# Patient Record
Sex: FEMALE | Race: WHITE | NOT HISPANIC OR LATINO
[De-identification: names, ages, dates, MRNs, and addresses within clinical notes are randomized per-mention and may not be internally consistent; named-entity substitution may affect disease eponyms.]

---

## 2017-01-25 ENCOUNTER — MEDICATION RENEWAL (OUTPATIENT)
Age: 72
End: 2017-01-25

## 2017-01-25 ENCOUNTER — APPOINTMENT (OUTPATIENT)
Dept: NEUROLOGY | Facility: CLINIC | Age: 72
End: 2017-01-25

## 2017-01-25 VITALS
WEIGHT: 121 LBS | SYSTOLIC BLOOD PRESSURE: 118 MMHG | BODY MASS INDEX: 20.16 KG/M2 | HEART RATE: 82 BPM | DIASTOLIC BLOOD PRESSURE: 72 MMHG | HEIGHT: 65 IN

## 2017-01-27 ENCOUNTER — TRANSCRIPTION ENCOUNTER (OUTPATIENT)
Age: 72
End: 2017-01-27

## 2017-02-01 ENCOUNTER — TRANSCRIPTION ENCOUNTER (OUTPATIENT)
Age: 72
End: 2017-02-01

## 2017-04-03 ENCOUNTER — RX RENEWAL (OUTPATIENT)
Age: 72
End: 2017-04-03

## 2017-04-05 ENCOUNTER — MEDICATION RENEWAL (OUTPATIENT)
Age: 72
End: 2017-04-05

## 2017-05-10 ENCOUNTER — APPOINTMENT (OUTPATIENT)
Dept: NEUROLOGY | Facility: CLINIC | Age: 72
End: 2017-05-10

## 2017-05-10 VITALS
WEIGHT: 121 LBS | SYSTOLIC BLOOD PRESSURE: 125 MMHG | BODY MASS INDEX: 19.44 KG/M2 | HEIGHT: 66 IN | HEART RATE: 80 BPM | DIASTOLIC BLOOD PRESSURE: 81 MMHG

## 2017-06-14 ENCOUNTER — MEDICATION RENEWAL (OUTPATIENT)
Age: 72
End: 2017-06-14

## 2017-08-29 ENCOUNTER — RX RENEWAL (OUTPATIENT)
Age: 72
End: 2017-08-29

## 2017-09-01 ENCOUNTER — TRANSCRIPTION ENCOUNTER (OUTPATIENT)
Age: 72
End: 2017-09-01

## 2017-09-01 ENCOUNTER — RX RENEWAL (OUTPATIENT)
Age: 72
End: 2017-09-01

## 2017-09-05 ENCOUNTER — RX RENEWAL (OUTPATIENT)
Age: 72
End: 2017-09-05

## 2017-09-13 ENCOUNTER — APPOINTMENT (OUTPATIENT)
Dept: NEUROLOGY | Facility: CLINIC | Age: 72
End: 2017-09-13
Payer: MEDICARE

## 2017-09-13 VITALS — SYSTOLIC BLOOD PRESSURE: 122 MMHG | DIASTOLIC BLOOD PRESSURE: 68 MMHG

## 2017-09-13 PROCEDURE — 99213 OFFICE O/P EST LOW 20 MIN: CPT

## 2017-10-31 ENCOUNTER — MEDICATION RENEWAL (OUTPATIENT)
Age: 72
End: 2017-10-31

## 2017-11-27 ENCOUNTER — OUTPATIENT (OUTPATIENT)
Dept: OUTPATIENT SERVICES | Facility: HOSPITAL | Age: 72
LOS: 1 days | End: 2017-11-27
Payer: MEDICARE

## 2017-11-27 PROCEDURE — 77067 SCR MAMMO BI INCL CAD: CPT

## 2017-11-27 PROCEDURE — 77065 DX MAMMO INCL CAD UNI: CPT

## 2017-11-27 PROCEDURE — G0202: CPT | Mod: 26,59

## 2017-11-27 PROCEDURE — 76641 ULTRASOUND BREAST COMPLETE: CPT | Mod: 26,50

## 2017-11-27 PROCEDURE — 76641 ULTRASOUND BREAST COMPLETE: CPT

## 2017-11-27 PROCEDURE — G0206: CPT | Mod: 26,LT,GG

## 2017-12-07 ENCOUNTER — MEDICATION RENEWAL (OUTPATIENT)
Age: 72
End: 2017-12-07

## 2018-01-17 ENCOUNTER — APPOINTMENT (OUTPATIENT)
Dept: NEUROLOGY | Facility: CLINIC | Age: 73
End: 2018-01-17
Payer: MEDICARE

## 2018-01-17 VITALS — DIASTOLIC BLOOD PRESSURE: 70 MMHG | SYSTOLIC BLOOD PRESSURE: 100 MMHG

## 2018-01-17 PROCEDURE — 99214 OFFICE O/P EST MOD 30 MIN: CPT

## 2018-03-08 ENCOUNTER — FORM ENCOUNTER (OUTPATIENT)
Age: 73
End: 2018-03-08

## 2018-03-09 ENCOUNTER — APPOINTMENT (OUTPATIENT)
Dept: ORTHOPEDIC SURGERY | Facility: CLINIC | Age: 73
End: 2018-03-09
Payer: MEDICARE

## 2018-03-09 ENCOUNTER — OUTPATIENT (OUTPATIENT)
Dept: OUTPATIENT SERVICES | Facility: HOSPITAL | Age: 73
LOS: 1 days | End: 2018-03-09
Payer: MEDICARE

## 2018-03-09 VITALS — WEIGHT: 120 LBS | HEIGHT: 66 IN | BODY MASS INDEX: 19.29 KG/M2

## 2018-03-09 PROCEDURE — 20611 DRAIN/INJ JOINT/BURSA W/US: CPT | Mod: LT

## 2018-03-09 PROCEDURE — 73564 X-RAY EXAM KNEE 4 OR MORE: CPT

## 2018-03-09 PROCEDURE — 73564 X-RAY EXAM KNEE 4 OR MORE: CPT | Mod: 26,50

## 2018-03-09 PROCEDURE — 99203 OFFICE O/P NEW LOW 30 MIN: CPT | Mod: 25

## 2018-03-14 ENCOUNTER — APPOINTMENT (OUTPATIENT)
Dept: ORTHOPEDIC SURGERY | Facility: CLINIC | Age: 73
End: 2018-03-14
Payer: MEDICARE

## 2018-03-14 PROCEDURE — 99213 OFFICE O/P EST LOW 20 MIN: CPT | Mod: 25

## 2018-03-14 PROCEDURE — 20610 DRAIN/INJ JOINT/BURSA W/O US: CPT | Mod: LT

## 2018-03-21 ENCOUNTER — APPOINTMENT (OUTPATIENT)
Dept: ORTHOPEDIC SURGERY | Facility: CLINIC | Age: 73
End: 2018-03-21
Payer: MEDICARE

## 2018-03-21 PROCEDURE — 99213 OFFICE O/P EST LOW 20 MIN: CPT

## 2018-05-24 ENCOUNTER — APPOINTMENT (OUTPATIENT)
Dept: NEUROLOGY | Facility: CLINIC | Age: 73
End: 2018-05-24
Payer: MEDICARE

## 2018-05-24 VITALS
WEIGHT: 125 LBS | BODY MASS INDEX: 20.83 KG/M2 | DIASTOLIC BLOOD PRESSURE: 73 MMHG | SYSTOLIC BLOOD PRESSURE: 124 MMHG | HEIGHT: 65 IN | HEART RATE: 98 BPM

## 2018-05-24 PROCEDURE — 99215 OFFICE O/P EST HI 40 MIN: CPT

## 2018-05-24 RX ORDER — BUTALBITAL, ACETAMINOPHEN AND CAFFEINE 325; 50; 40 MG/1; MG/1; MG/1
50-325-40 TABLET ORAL
Qty: 90 | Refills: 0 | Status: DISCONTINUED | COMMUNITY
Start: 2017-11-02 | End: 2018-05-24

## 2018-05-24 RX ORDER — BUTALBITAL, ACETAMINOPHEN AND CAFFEINE 50; 325; 40 MG/1; MG/1; MG/1
50-325-40 CAPSULE ORAL
Qty: 120 | Refills: 0 | Status: DISCONTINUED | COMMUNITY
Start: 2017-01-25 | End: 2018-05-24

## 2018-06-06 ENCOUNTER — MEDICATION RENEWAL (OUTPATIENT)
Age: 73
End: 2018-06-06

## 2018-06-06 RX ORDER — ELETRIPTAN HYDROBROMIDE 40 MG/1
40 TABLET, FILM COATED ORAL
Qty: 9 | Refills: 5 | Status: DISCONTINUED | COMMUNITY
Start: 2018-05-24 | End: 2018-06-06

## 2018-07-03 ENCOUNTER — TRANSCRIPTION ENCOUNTER (OUTPATIENT)
Age: 73
End: 2018-07-03

## 2018-07-03 ENCOUNTER — MEDICATION RENEWAL (OUTPATIENT)
Age: 73
End: 2018-07-03

## 2018-07-12 ENCOUNTER — RX RENEWAL (OUTPATIENT)
Age: 73
End: 2018-07-12

## 2018-08-21 ENCOUNTER — RX RENEWAL (OUTPATIENT)
Age: 73
End: 2018-08-21

## 2018-10-25 ENCOUNTER — APPOINTMENT (OUTPATIENT)
Dept: NEUROLOGY | Facility: CLINIC | Age: 73
End: 2018-10-25
Payer: MEDICARE

## 2018-10-25 VITALS
HEART RATE: 83 BPM | HEIGHT: 65 IN | DIASTOLIC BLOOD PRESSURE: 79 MMHG | WEIGHT: 131 LBS | BODY MASS INDEX: 21.83 KG/M2 | SYSTOLIC BLOOD PRESSURE: 121 MMHG

## 2018-10-25 PROCEDURE — 99215 OFFICE O/P EST HI 40 MIN: CPT

## 2018-11-07 ENCOUNTER — APPOINTMENT (OUTPATIENT)
Dept: MAMMOGRAPHY | Facility: HOSPITAL | Age: 73
End: 2018-11-07
Payer: MEDICARE

## 2018-11-07 ENCOUNTER — OUTPATIENT (OUTPATIENT)
Dept: OUTPATIENT SERVICES | Facility: HOSPITAL | Age: 73
LOS: 1 days | End: 2018-11-07
Payer: MEDICARE

## 2018-11-07 ENCOUNTER — APPOINTMENT (OUTPATIENT)
Dept: ULTRASOUND IMAGING | Facility: HOSPITAL | Age: 73
End: 2018-11-07
Payer: MEDICARE

## 2018-11-07 PROCEDURE — 77065 DX MAMMO INCL CAD UNI: CPT | Mod: 26,GG,LT

## 2018-11-07 PROCEDURE — 76641 ULTRASOUND BREAST COMPLETE: CPT | Mod: 26,50

## 2018-11-07 PROCEDURE — 77067 SCR MAMMO BI INCL CAD: CPT

## 2018-11-07 PROCEDURE — 77063 BREAST TOMOSYNTHESIS BI: CPT | Mod: 26,59

## 2018-11-07 PROCEDURE — 77067 SCR MAMMO BI INCL CAD: CPT | Mod: 26,59

## 2018-11-07 PROCEDURE — 76641 ULTRASOUND BREAST COMPLETE: CPT

## 2018-11-07 PROCEDURE — 77065 DX MAMMO INCL CAD UNI: CPT

## 2018-11-07 PROCEDURE — 77063 BREAST TOMOSYNTHESIS BI: CPT

## 2018-11-19 ENCOUNTER — OUTPATIENT (OUTPATIENT)
Dept: OUTPATIENT SERVICES | Facility: HOSPITAL | Age: 73
LOS: 1 days | End: 2018-11-19
Payer: MEDICARE

## 2018-11-19 ENCOUNTER — RESULT REVIEW (OUTPATIENT)
Age: 73
End: 2018-11-19

## 2018-11-19 ENCOUNTER — APPOINTMENT (OUTPATIENT)
Dept: MAMMOGRAPHY | Facility: HOSPITAL | Age: 73
End: 2018-11-19
Payer: MEDICARE

## 2018-11-19 PROCEDURE — 88341 IMHCHEM/IMCYTCHM EA ADD ANTB: CPT

## 2018-11-19 PROCEDURE — 88305 TISSUE EXAM BY PATHOLOGIST: CPT

## 2018-11-19 PROCEDURE — 19081 BX BREAST 1ST LESION STRTCTC: CPT | Mod: LT

## 2018-11-19 PROCEDURE — A4648: CPT

## 2018-11-19 PROCEDURE — 19081 BX BREAST 1ST LESION STRTCTC: CPT

## 2018-11-21 LAB — SURGICAL PATHOLOGY STUDY: SIGNIFICANT CHANGE UP

## 2019-03-19 ENCOUNTER — TRANSCRIPTION ENCOUNTER (OUTPATIENT)
Age: 74
End: 2019-03-19

## 2019-03-20 ENCOUNTER — TRANSCRIPTION ENCOUNTER (OUTPATIENT)
Age: 74
End: 2019-03-20

## 2019-03-28 ENCOUNTER — MEDICATION RENEWAL (OUTPATIENT)
Age: 74
End: 2019-03-28

## 2019-04-03 ENCOUNTER — APPOINTMENT (OUTPATIENT)
Dept: OTOLARYNGOLOGY | Facility: CLINIC | Age: 74
End: 2019-04-03
Payer: MEDICARE

## 2019-04-03 VITALS
HEART RATE: 89 BPM | SYSTOLIC BLOOD PRESSURE: 115 MMHG | OXYGEN SATURATION: 97 % | TEMPERATURE: 98 F | DIASTOLIC BLOOD PRESSURE: 74 MMHG | RESPIRATION RATE: 16 BRPM

## 2019-04-03 VITALS — WEIGHT: 125 LBS | BODY MASS INDEX: 20.8 KG/M2

## 2019-04-03 DIAGNOSIS — G43.109 MIGRAINE WITH AURA, NOT INTRACTABLE, W/OUT STATUS MIGRAINOSUS: ICD-10-CM

## 2019-04-03 PROCEDURE — 31231 NASAL ENDOSCOPY DX: CPT

## 2019-04-03 PROCEDURE — 99204 OFFICE O/P NEW MOD 45 MIN: CPT | Mod: 25

## 2019-04-03 NOTE — REASON FOR VISIT
[Initial Evaluation] : an initial evaluation for [FreeTextEntry2] : vestibular migraine, allergic rhinitis

## 2019-04-03 NOTE — PHYSICAL EXAM
[Nasal Endoscopy Performed] : nasal endoscopy was performed, see procedure section for findings [de-identified] : ar [de-identified] : ar [Midline] : trachea located in midline position [] : Pullman-Hallpike test is negative [Normal] : no rashes [de-identified] : gait steady

## 2019-04-03 NOTE — HISTORY OF PRESENT ILLNESS
[de-identified] : 74 yo woman whose  just had  culture positive for an unusual but sensitive staph infection her to make sure she does not have it also. She relates chronic allergic rhinitis for which she uses Nasacort and she has shared her 's. She also has vestibular migraine and occ posnal vertigo. She recently had an episode last weekend which was severe. Sees Dr Kanner for her migraines and has medication which helps.

## 2019-04-07 LAB
BACTERIA NOSE AEROBE CULT: NORMAL
BACTERIA NOSE AEROBE CULT: NORMAL

## 2019-04-25 ENCOUNTER — APPOINTMENT (OUTPATIENT)
Dept: NEUROLOGY | Facility: CLINIC | Age: 74
End: 2019-04-25
Payer: MEDICARE

## 2019-04-25 VITALS
HEIGHT: 65 IN | DIASTOLIC BLOOD PRESSURE: 75 MMHG | SYSTOLIC BLOOD PRESSURE: 125 MMHG | BODY MASS INDEX: 21.49 KG/M2 | HEART RATE: 92 BPM | WEIGHT: 129 LBS

## 2019-04-25 PROCEDURE — 99214 OFFICE O/P EST MOD 30 MIN: CPT

## 2019-04-25 NOTE — ASSESSMENT
[FreeTextEntry1] : Ms. Looney is seen in follow up for her mixed headache syndrome, with migraine and tension-type headaches. She states that she has not had any severe migraines since her last visit, in October. A few days per month, she awakens with a feeling like she is "going to get one". The intensity of those headaches only about 2 or 3/10. However, she takes a Rizatriptan, with relief. She has not been using the Esgic, since we have a long conversation about controlling it. She takes alprazolam occasionally.\par Mrs. 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 She seems to settle down quite a bit. We spent most of the visit discussing control of medications. She has agreed to obtain all of her medications from her pharmacy in [place], so that I can monitor the prescriptions. I will renew her prescriptions and see her in followup in 6 months.  I have checked the [facility] website and there have been no unauthorized prescriptions.

## 2019-04-25 NOTE — HISTORY OF PRESENT ILLNESS
[FreeTextEntry1] : Ms. Looney is seen in follow up for her mixed headache syndrome, with migraine and tension-type headaches. She states that she has not had any severe migraines since her last visit, in October. A few days per month, she awakens with a feeling like she is "going to get one". The intensity of those headaches only about 2 or 3/10. However, she takes a Rizatriptan, with relief. She has not been using the Esgic, since we have a long conversation about controlling it. She takes alprazolam occasionally.

## 2019-04-26 ENCOUNTER — MEDICATION RENEWAL (OUTPATIENT)
Age: 74
End: 2019-04-26

## 2019-06-04 ENCOUNTER — APPOINTMENT (OUTPATIENT)
Dept: INTERNAL MEDICINE | Facility: CLINIC | Age: 74
End: 2019-06-04
Payer: MEDICARE

## 2019-06-04 VITALS
DIASTOLIC BLOOD PRESSURE: 64 MMHG | SYSTOLIC BLOOD PRESSURE: 108 MMHG | WEIGHT: 128 LBS | BODY MASS INDEX: 21.33 KG/M2 | OXYGEN SATURATION: 98 % | HEART RATE: 91 BPM | TEMPERATURE: 98 F | HEIGHT: 65 IN

## 2019-06-04 DIAGNOSIS — Z78.9 OTHER SPECIFIED HEALTH STATUS: ICD-10-CM

## 2019-06-04 DIAGNOSIS — M17.12 UNILATERAL PRIMARY OSTEOARTHRITIS, LEFT KNEE: ICD-10-CM

## 2019-06-04 DIAGNOSIS — H81.10 BENIGN PAROXYSMAL VERTIGO, UNSPECIFIED EAR: ICD-10-CM

## 2019-06-04 DIAGNOSIS — J30.1 ALLERGIC RHINITIS DUE TO POLLEN: ICD-10-CM

## 2019-06-04 DIAGNOSIS — Z80.0 FAMILY HISTORY OF MALIGNANT NEOPLASM OF DIGESTIVE ORGANS: ICD-10-CM

## 2019-06-04 PROCEDURE — 99203 OFFICE O/P NEW LOW 30 MIN: CPT

## 2019-06-04 RX ORDER — VITAMIN E ACID SUCCINATE 268 MG
TABLET ORAL
Refills: 0 | Status: ACTIVE | COMMUNITY

## 2019-06-04 RX ORDER — PNV NO.95/FERROUS FUM/FOLIC AC 28MG-0.8MG
TABLET ORAL
Refills: 0 | Status: ACTIVE | COMMUNITY

## 2019-06-04 RX ORDER — BUTALBITAL, ACETAMINOPHEN AND CAFFEINE 50; 325; 40 MG/1; MG/1; MG/1
50-325-40 CAPSULE ORAL
Qty: 120 | Refills: 1 | Status: COMPLETED | COMMUNITY
Start: 2018-07-03 | End: 2019-06-04

## 2019-06-04 RX ORDER — MOMETASONE 50 UG/1
50 SPRAY, METERED NASAL
Qty: 1 | Refills: 5 | Status: COMPLETED | COMMUNITY
Start: 2019-04-26 | End: 2019-06-04

## 2019-06-04 RX ORDER — CHROMIUM 200 MCG
1000 TABLET ORAL
Refills: 0 | Status: ACTIVE | COMMUNITY

## 2019-06-04 RX ORDER — METAPROTERENOL SULFATE 20 MG
TABLET ORAL
Refills: 0 | Status: ACTIVE | COMMUNITY

## 2019-06-04 RX ORDER — VALACYCLOVIR 1 G/1
1 TABLET, FILM COATED ORAL
Qty: 20 | Refills: 0 | Status: COMPLETED | COMMUNITY
Start: 2019-01-25 | End: 2019-06-04

## 2019-06-04 RX ORDER — METHYLPREDNISOLONE 4 MG/1
4 TABLET ORAL
Qty: 21 | Refills: 0 | Status: COMPLETED | COMMUNITY
Start: 2018-12-20 | End: 2019-06-04

## 2019-06-04 RX ORDER — MULTIVIT-MIN/IRON/FOLIC ACID/K 18-600-40
500 CAPSULE ORAL
Refills: 0 | Status: ACTIVE | COMMUNITY

## 2019-06-04 NOTE — ASSESSMENT
[FreeTextEntry1] : Pt will return for fasting labs to evaulation statin therapy.\par F/u neuro as needed for migraines.\par Syncope attributed to multiple factors, none cardiac, resolved with IVF.\par Will return for fasting labs.

## 2019-06-04 NOTE — PHYSICAL EXAM
[No Acute Distress] : no acute distress [Well Nourished] : well nourished [Normal Outer Ear/Nose] : the outer ears and nose were normal in appearance [Normal Sclera/Conjunctiva] : normal sclera/conjunctiva [No Respiratory Distress] : no respiratory distress  [Normal Gait] : normal gait [No Edema] : there was no peripheral edema [Alert and Oriented x3] : oriented to person, place, and time [de-identified] : OA changes on hands [Normal Affect] : the affect was normal

## 2019-06-04 NOTE — HEALTH RISK ASSESSMENT
[Patient reported colonoscopy was normal] : Patient reported colonoscopy was normal [Patient reported PAP Smear was normal] : Patient reported PAP Smear was normal [0] : 2) Feeling down, depressed, or hopeless: Not at all (0) [Patient reported mammogram was normal] : Patient reported mammogram was normal [[id number]] : 0 [PapSmearDate] : 12/18 [MammogramDate] : 12/18 [ColonoscopyDate] : 05/17

## 2019-06-04 NOTE — HISTORY OF PRESENT ILLNESS
[de-identified] : 73 y/o female is here to establish care.\par She had a syncopal episode last month in [place] in the setting of a long flight, several glasses of wine (which is unusual for her), and taking her migraine meds. She went to ER and EKG, CXR and labs were good. She received several liters of IVF and felt fine so came home. No symptoms since. \par Gets bad migraines and sees neurology. No symptoms today. Knows how to treat.\par On alternating doses of Atorvastatin 10/20 mg. Had CPE in January. ALl was well.\par UTD with HCM including vaccines.\par Wants to review all supplements with me.

## 2019-07-22 ENCOUNTER — RX RENEWAL (OUTPATIENT)
Age: 74
End: 2019-07-22

## 2019-07-31 ENCOUNTER — APPOINTMENT (OUTPATIENT)
Dept: INTERNAL MEDICINE | Facility: CLINIC | Age: 74
End: 2019-07-31
Payer: MEDICARE

## 2019-07-31 VITALS
BODY MASS INDEX: 21.33 KG/M2 | TEMPERATURE: 97.9 F | HEART RATE: 87 BPM | SYSTOLIC BLOOD PRESSURE: 118 MMHG | WEIGHT: 128 LBS | DIASTOLIC BLOOD PRESSURE: 76 MMHG | HEIGHT: 65 IN | OXYGEN SATURATION: 97 %

## 2019-07-31 PROCEDURE — 99213 OFFICE O/P EST LOW 20 MIN: CPT | Mod: 25

## 2019-07-31 PROCEDURE — 36415 COLL VENOUS BLD VENIPUNCTURE: CPT

## 2019-07-31 NOTE — HISTORY OF PRESENT ILLNESS
[FreeTextEntry1] : Here to f/u on high lipids.\par Last labs done 11/18. \par No complaints.\par Will need refills.\par

## 2019-10-03 ENCOUNTER — MEDICATION RENEWAL (OUTPATIENT)
Age: 74
End: 2019-10-03

## 2019-10-07 ENCOUNTER — MEDICATION RENEWAL (OUTPATIENT)
Age: 74
End: 2019-10-07

## 2019-10-09 ENCOUNTER — TRANSCRIPTION ENCOUNTER (OUTPATIENT)
Age: 74
End: 2019-10-09

## 2019-10-14 ENCOUNTER — RX RENEWAL (OUTPATIENT)
Age: 74
End: 2019-10-14

## 2019-10-31 ENCOUNTER — APPOINTMENT (OUTPATIENT)
Dept: NEUROLOGY | Facility: CLINIC | Age: 74
End: 2019-10-31
Payer: MEDICARE

## 2019-10-31 VITALS
SYSTOLIC BLOOD PRESSURE: 118 MMHG | HEIGHT: 65 IN | DIASTOLIC BLOOD PRESSURE: 73 MMHG | HEART RATE: 93 BPM | WEIGHT: 123 LBS | BODY MASS INDEX: 20.49 KG/M2

## 2019-10-31 VITALS — HEART RATE: 84 BPM | DIASTOLIC BLOOD PRESSURE: 70 MMHG | SYSTOLIC BLOOD PRESSURE: 110 MMHG

## 2019-10-31 VITALS — DIASTOLIC BLOOD PRESSURE: 70 MMHG | SYSTOLIC BLOOD PRESSURE: 110 MMHG

## 2019-10-31 PROCEDURE — 99215 OFFICE O/P EST HI 40 MIN: CPT

## 2019-10-31 NOTE — PHYSICAL EXAM
[General Appearance - Alert] : alert [General Appearance - In No Acute Distress] : in no acute distress [Oriented To Time, Place, And Person] : oriented to person, place, and time [Impaired Insight] : insight and judgment were intact [Affect] : the affect was normal [Person] : oriented to person [Place] : oriented to place [Time] : oriented to time [Concentration Intact] : normal concentrating ability [Visual Intact] : visual attention was ~T not ~L decreased [Naming Objects] : no difficulty naming common objects [Repeating Phrases] : no difficulty repeating a phrase [Writing A Sentence] : no difficulty writing a sentence [Fluency] : fluency intact [Comprehension] : comprehension intact [Reading] : reading intact [Past History] : adequate knowledge of personal past history [Cranial Nerves Optic (II)] : visual acuity intact bilaterally,  visual fields full to confrontation, pupils equal round and reactive to light [Cranial Nerves Oculomotor (III)] : extraocular motion intact [Cranial Nerves Trigeminal (V)] : facial sensation intact symmetrically [Cranial Nerves Facial (VII)] : face symmetrical [Cranial Nerves Vestibulocochlear (VIII)] : hearing was intact bilaterally [Cranial Nerves Glossopharyngeal (IX)] : tongue and palate midline [Cranial Nerves Accessory (XI - Cranial And Spinal)] : head turning and shoulder shrug symmetric [Cranial Nerves Hypoglossal (XII)] : there was no tongue deviation with protrusion [Motor Tone] : muscle tone was normal in all four extremities [Motor Strength] : muscle strength was normal in all four extremities [No Muscle Atrophy] : normal bulk in all four extremities [Sensation Tactile Decrease] : light touch was intact [Abnormal Walk] : normal gait [Balance] : balance was intact [2+] : Ankle jerk left 2+ [] : no respiratory distress [Auscultation Breath Sounds / Voice Sounds] : lungs were clear to auscultation bilaterally [Heart Rate And Rhythm] : heart rate was normal and rhythm regular [Heart Sounds] : normal S1 and S2 [Heart Sounds Gallop] : no gallops [Heart Sounds Pericardial Friction Rub] : no pericardial rub [Systolic grade ___/6] : A grade [unfilled]/6 systolic murmur was heard. [Past-pointing] : there was no past-pointing [Tremor] : no tremor present [Plantar Reflex Right Only] : normal on the right [Plantar Reflex Left Only] : normal on the left

## 2019-10-31 NOTE — HISTORY OF PRESENT ILLNESS
[FreeTextEntry1] : Mrs. Looney is seen in followup for her mixed headache syndrome, but with a new problem. She was at a wedding in [place] and, as is unusual for her, she had some line. The next day, she had a severe migraine with nausea, vomiting, photophobia and phonophobia. She was unable to the entire day. She went to the wedding that night and apparently passed out. She was taken to a local hospital, where she was found to be severely dehydrated, hypotensive and hypothermic. With hydration and warming, she recovered entirely. She was apparently in and out for about 5 or 6 minutes and regained full consciousness after that. She underwent a full workup that was unrevealing. When she returned, she saw a new internist, who performed a full and unremarkable physical examination.  She has not had any further significant headaches. Her marital issues continue.

## 2019-10-31 NOTE — ASSESSMENT
[FreeTextEntry1] : Mrs. Looney is seen in followup for her mixed headache syndrome, but with a new problem. She was at a wedding in [place] and, as is unusual for her, she had some line. The next day, she had a severe migraine with nausea, vomiting, photophobia and phonophobia. She was unable to the entire day. She went to the wedding that night and apparently passed out. She was taken to a local hospital, where she was found to be severely dehydrated, hypotensive and hypothermic. With hydration and warming, she recovered entirely. She was apparently in and out for about 5 or 6 minutes and regained full consciousness after that. She underwent a full workup that was unrevealing. When she returned, she saw a new internist, who performed a full and unremarkable physical examination.  She has not had any further significant headaches. Her marital issues continue.\par On examination today, her blood pressure is normal and there are no orthostatic changes. Her neurological examination remains entirely normal. 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 Her heart murmur is extremely soft and may be simply a flow murmur.There is nothing to indicate that this was anything other than syncope following severe dehydration. I spent most of the visit discussing appropriate hydration and her marital issues. I will see her in followup in 6 months.

## 2019-11-07 ENCOUNTER — APPOINTMENT (OUTPATIENT)
Dept: ULTRASOUND IMAGING | Facility: CLINIC | Age: 74
End: 2019-11-07
Payer: MEDICARE

## 2019-11-07 ENCOUNTER — APPOINTMENT (OUTPATIENT)
Dept: MAMMOGRAPHY | Facility: CLINIC | Age: 74
End: 2019-11-07
Payer: MEDICARE

## 2019-11-07 ENCOUNTER — OUTPATIENT (OUTPATIENT)
Dept: OUTPATIENT SERVICES | Facility: HOSPITAL | Age: 74
LOS: 1 days | End: 2019-11-07

## 2019-11-07 PROCEDURE — 77063 BREAST TOMOSYNTHESIS BI: CPT | Mod: 26

## 2019-11-07 PROCEDURE — 77067 SCR MAMMO BI INCL CAD: CPT | Mod: 26

## 2019-11-07 PROCEDURE — 76641 ULTRASOUND BREAST COMPLETE: CPT | Mod: 26,50

## 2019-11-11 ENCOUNTER — RX RENEWAL (OUTPATIENT)
Age: 74
End: 2019-11-11

## 2019-12-07 ENCOUNTER — RX RENEWAL (OUTPATIENT)
Age: 74
End: 2019-12-07

## 2019-12-30 ENCOUNTER — RX RENEWAL (OUTPATIENT)
Age: 74
End: 2019-12-30

## 2020-01-14 ENCOUNTER — APPOINTMENT (OUTPATIENT)
Dept: OBGYN | Facility: CLINIC | Age: 75
End: 2020-01-14
Payer: MEDICARE

## 2020-01-14 VITALS
BODY MASS INDEX: 22.88 KG/M2 | HEIGHT: 65 IN | DIASTOLIC BLOOD PRESSURE: 60 MMHG | SYSTOLIC BLOOD PRESSURE: 100 MMHG | WEIGHT: 137.31 LBS

## 2020-01-14 DIAGNOSIS — Z87.898 PERSONAL HISTORY OF OTHER SPECIFIED CONDITIONS: ICD-10-CM

## 2020-01-14 PROCEDURE — G0101: CPT

## 2020-01-14 NOTE — PHYSICAL EXAM
[Awake] : awake [Alert] : alert [Examination Of The Breasts] : a normal appearance [Breast Palpation Diffuse Fibrous Tissue Bilateral] : fibrocystic changes [Breast Nipple Flattening Left] : flattened [No Discharge] : no discharge [Soft] : soft [Scar] : a scar was noted [Suprapubic] : in the suprapubic area [No Mass] : no masses were palpated [Soft, Nontender] : the abdomen was soft and nontender [No HSM] : no hepatosplenomegaly noted [None] : no hernias were palpable [Oriented x3] : oriented to person, place, and time [Normal] : clitoris [Labia Minora] : labia minora [Labia Majora] : labia major [Pap Obtained] : a Pap smear was performed [No Bleeding] : there was no active vaginal bleeding [Uterine Adnexae] : were not tender and not enlarged [No Tenderness] : no rectal tenderness [Nl Sphincter Tone] : normal sphincter tone [Acute Distress] : no acute distress [Mass] : no breast mass [Nipple Discharge] : no nipple discharge [Axillary LAD] : no axillary lymphadenopathy [Tender] : non tender [Axillary Lymph Nodes Enlarged Bilaterally] : no enlarged nodes [Discharge] : no discharge [Motion Tenderness] : there was no cervical motion tenderness [Adnexa Tenderness] : were not tender [Ovarian Mass (___ Cm)] : there were no adnexal masses [Occult Blood] : occult blood test from digital rectal exam was negative [Internal Hemorrhoid] : no internal hemorrhoids [External Hemorrhoid] : no external hemorrhoids

## 2020-01-14 NOTE — HISTORY OF PRESENT ILLNESS
[1 Year Ago] : 1 year ago [Good] : being in good health [Healthy Diet] : a healthy diet [Regular Exercise] : regular exercise [Up to Date] : up to date with ~his/her~ STD screening [Weight Concerns] : no concerns with her weight [Menstrual Problems] : reports normal menses [Fever] : no fever [Nausea] : no nausea [Vomiting] : no vomiting [Diarrhea] : no diarrhea [Pelvic Pressure] : no pelvic pressure [Vaginal Bleeding] : no vaginal bleeding [Dysuria] : no dysuria [Burning] : no burning [Lesion] : no lesion [Sexually Active] : is not sexually active

## 2020-01-22 ENCOUNTER — RX RENEWAL (OUTPATIENT)
Age: 75
End: 2020-01-22

## 2020-01-28 ENCOUNTER — RX RENEWAL (OUTPATIENT)
Age: 75
End: 2020-01-28

## 2020-01-28 ENCOUNTER — MOBILE ON CALL (OUTPATIENT)
Age: 75
End: 2020-01-28

## 2020-01-29 LAB — CYTOLOGY CVX/VAG DOC THIN PREP: NORMAL

## 2020-02-07 ENCOUNTER — TRANSCRIPTION ENCOUNTER (OUTPATIENT)
Age: 75
End: 2020-02-07

## 2020-02-13 ENCOUNTER — TRANSCRIPTION ENCOUNTER (OUTPATIENT)
Age: 75
End: 2020-02-13

## 2020-02-19 ENCOUNTER — APPOINTMENT (OUTPATIENT)
Dept: INTERNAL MEDICINE | Facility: CLINIC | Age: 75
End: 2020-02-19
Payer: MEDICARE

## 2020-02-19 VITALS
HEIGHT: 65 IN | TEMPERATURE: 97.8 F | WEIGHT: 132 LBS | DIASTOLIC BLOOD PRESSURE: 62 MMHG | BODY MASS INDEX: 21.99 KG/M2 | OXYGEN SATURATION: 97 % | HEART RATE: 80 BPM | SYSTOLIC BLOOD PRESSURE: 108 MMHG

## 2020-02-19 DIAGNOSIS — Z12.39 ENCOUNTER FOR OTHER SCREENING FOR MALIGNANT NEOPLASM OF BREAST: ICD-10-CM

## 2020-02-19 PROCEDURE — 99214 OFFICE O/P EST MOD 30 MIN: CPT | Mod: 25

## 2020-02-19 PROCEDURE — 36415 COLL VENOUS BLD VENIPUNCTURE: CPT

## 2020-02-19 RX ORDER — NORTRIPTYLINE HYDROCHLORIDE 25 MG/1
25 CAPSULE ORAL
Qty: 180 | Refills: 2 | Status: COMPLETED | COMMUNITY
End: 2020-02-19

## 2020-02-19 NOTE — HEALTH RISK ASSESSMENT
[Good] : ~his/her~  mood as  good [No falls in past year] : Patient reported no falls in the past year [0] : 2) Feeling down, depressed, or hopeless: Not at all (0) [GNS4Oxnpr] : 0 [ColonoscopyDate] : 05/17 [MammogramDate] : 12/19 [PapSmearDate] : 01/20

## 2020-02-19 NOTE — HISTORY OF PRESENT ILLNESS
[de-identified] : 75 y/o female is here for f/u.\par On statin-alternates 10mg/20mg. No side effects.\par Used to take Fosamax x 5 years (about 15 years ago). Hasn't had bone density in years.\par UTD with MAMMO.\par Had increase in migraine frequency and intensity so increased TCA dose to 50 mg and feels "better now."

## 2020-02-20 LAB
ALBUMIN SERPL ELPH-MCNC: 4.6 G/DL
ALP BLD-CCNC: 90 U/L
ALT SERPL-CCNC: 13 U/L
ANION GAP SERPL CALC-SCNC: 11 MMOL/L
AST SERPL-CCNC: 24 U/L
BILIRUB SERPL-MCNC: 0.2 MG/DL
BUN SERPL-MCNC: 17 MG/DL
CALCIUM SERPL-MCNC: 9.7 MG/DL
CHLORIDE SERPL-SCNC: 104 MMOL/L
CHOLEST SERPL-MCNC: 176 MG/DL
CHOLEST/HDLC SERPL: 2.7 RATIO
CK SERPL-CCNC: 67 U/L
CO2 SERPL-SCNC: 25 MMOL/L
CREAT SERPL-MCNC: 0.82 MG/DL
ESTIMATED AVERAGE GLUCOSE: 117 MG/DL
GLUCOSE SERPL-MCNC: 95 MG/DL
HBA1C MFR BLD HPLC: 5.7 %
HDLC SERPL-MCNC: 64 MG/DL
LDLC SERPL CALC-MCNC: 97 MG/DL
POTASSIUM SERPL-SCNC: 4.6 MMOL/L
PROT SERPL-MCNC: 6.6 G/DL
SODIUM SERPL-SCNC: 140 MMOL/L
TRIGL SERPL-MCNC: 75 MG/DL
TSH SERPL-ACNC: 3.34 UIU/ML

## 2020-02-24 ENCOUNTER — FORM ENCOUNTER (OUTPATIENT)
Age: 75
End: 2020-02-24

## 2020-02-25 ENCOUNTER — OUTPATIENT (OUTPATIENT)
Dept: OUTPATIENT SERVICES | Facility: HOSPITAL | Age: 75
LOS: 1 days | End: 2020-02-25

## 2020-02-25 ENCOUNTER — APPOINTMENT (OUTPATIENT)
Dept: RADIOLOGY | Facility: CLINIC | Age: 75
End: 2020-02-25
Payer: MEDICARE

## 2020-02-25 PROCEDURE — 77080 DXA BONE DENSITY AXIAL: CPT | Mod: 26

## 2020-03-03 ENCOUNTER — NON-APPOINTMENT (OUTPATIENT)
Age: 75
End: 2020-03-03

## 2020-03-04 ENCOUNTER — NON-APPOINTMENT (OUTPATIENT)
Age: 75
End: 2020-03-04

## 2020-05-17 ENCOUNTER — RX RENEWAL (OUTPATIENT)
Age: 75
End: 2020-05-17

## 2020-06-01 ENCOUNTER — RX RENEWAL (OUTPATIENT)
Age: 75
End: 2020-06-01

## 2020-06-29 ENCOUNTER — RX RENEWAL (OUTPATIENT)
Age: 75
End: 2020-06-29

## 2020-07-23 ENCOUNTER — APPOINTMENT (OUTPATIENT)
Dept: NEUROLOGY | Facility: CLINIC | Age: 75
End: 2020-07-23
Payer: MEDICARE

## 2020-07-23 VITALS
HEART RATE: 90 BPM | HEIGHT: 65 IN | SYSTOLIC BLOOD PRESSURE: 120 MMHG | BODY MASS INDEX: 21.99 KG/M2 | DIASTOLIC BLOOD PRESSURE: 67 MMHG | WEIGHT: 132 LBS

## 2020-07-23 VITALS — TEMPERATURE: 97.8 F

## 2020-07-23 PROCEDURE — 99214 OFFICE O/P EST MOD 30 MIN: CPT

## 2020-07-23 NOTE — HISTORY OF PRESENT ILLNESS
[FreeTextEntry1] : Mrs. Looney is seen in followup for her mixed headache syndrome. She has been doing reasonably well from the headache standpoint. She has not had any migraines. She has occasional tension headaches. Her main problem now is her anxiety. She continues to have a stress over her perception of her husbands alcoholism. She is further stressed by the isolation of the current pandemic.  She has isolated herself in her home in [place]. She uses Xanax for sleep.

## 2020-07-23 NOTE — ASSESSMENT
[FreeTextEntry1] : Mrs. Looney is seen in followup for her mixed headache syndrome. She has been doing reasonably well from the headache standpoint. She has not had any migraines. She has occasional tension headaches. Her main problem now is her anxiety. She continues to have a stress over her perception of her husbands alcoholism. She is further stressed by the isolation of the current pandemic.  She has isolated herself in her home in [place]. She uses Xanax for sleep.\par As with most of the prior visits, the entire visit was spent dealing with her anxiety and emotional concerns. I have given her instructions on mindfulness and on breathing techniques. I have also reinforced the need to take special care with controlled substances. We will have a virtual visit in 3 months, because she is concerned about going out.

## 2020-07-26 ENCOUNTER — NON-APPOINTMENT (OUTPATIENT)
Age: 75
End: 2020-07-26

## 2020-07-27 ENCOUNTER — RX RENEWAL (OUTPATIENT)
Age: 75
End: 2020-07-27

## 2020-07-28 RX ORDER — BUTALBITAL, ACETAMINOPHEN AND CAFFEINE 50; 325; 40 MG/1; MG/1; MG/1
50-325-40 CAPSULE ORAL
Qty: 120 | Refills: 1 | Status: DISCONTINUED | COMMUNITY
Start: 2020-07-23 | End: 2020-07-28

## 2020-08-11 ENCOUNTER — RX RENEWAL (OUTPATIENT)
Age: 75
End: 2020-08-11

## 2020-08-14 ENCOUNTER — RX RENEWAL (OUTPATIENT)
Age: 75
End: 2020-08-14

## 2020-09-03 ENCOUNTER — APPOINTMENT (OUTPATIENT)
Dept: INTERNAL MEDICINE | Facility: CLINIC | Age: 75
End: 2020-09-03
Payer: MEDICARE

## 2020-09-03 ENCOUNTER — NON-APPOINTMENT (OUTPATIENT)
Age: 75
End: 2020-09-03

## 2020-09-03 VITALS
OXYGEN SATURATION: 96 % | BODY MASS INDEX: 21.99 KG/M2 | TEMPERATURE: 98 F | WEIGHT: 132 LBS | DIASTOLIC BLOOD PRESSURE: 62 MMHG | HEIGHT: 65 IN | SYSTOLIC BLOOD PRESSURE: 108 MMHG | HEART RATE: 91 BPM

## 2020-09-03 DIAGNOSIS — Z87.898 PERSONAL HISTORY OF OTHER SPECIFIED CONDITIONS: ICD-10-CM

## 2020-09-03 DIAGNOSIS — S83.242A OTHER TEAR OF MEDIAL MENISCUS, CURRENT INJURY, LEFT KNEE, INITIAL ENCOUNTER: ICD-10-CM

## 2020-09-03 DIAGNOSIS — Z87.39 PERSONAL HISTORY OF OTHER DISEASES OF THE MUSCULOSKELETAL SYSTEM AND CONNECTIVE TISSUE: ICD-10-CM

## 2020-09-03 DIAGNOSIS — Z23 ENCOUNTER FOR IMMUNIZATION: ICD-10-CM

## 2020-09-03 DIAGNOSIS — Z01.00 ENCOUNTER FOR EXAMINATION OF EYES AND VISION W/OUT ABNORMAL FINDINGS: ICD-10-CM

## 2020-09-03 DIAGNOSIS — M70.52 OTHER BURSITIS OF KNEE, LEFT KNEE: ICD-10-CM

## 2020-09-03 DIAGNOSIS — Z13.820 ENCOUNTER FOR SCREENING FOR OSTEOPOROSIS: ICD-10-CM

## 2020-09-03 PROCEDURE — 90662 IIV NO PRSV INCREASED AG IM: CPT

## 2020-09-03 PROCEDURE — 36415 COLL VENOUS BLD VENIPUNCTURE: CPT

## 2020-09-03 PROCEDURE — G0439: CPT

## 2020-09-03 PROCEDURE — 99212 OFFICE O/P EST SF 10 MIN: CPT | Mod: 25

## 2020-09-03 PROCEDURE — 93000 ELECTROCARDIOGRAM COMPLETE: CPT

## 2020-09-03 PROCEDURE — G0008: CPT

## 2020-09-03 RX ORDER — ESTRADIOL 10 UG/1
10 TABLET VAGINAL
Qty: 8 | Refills: 0 | Status: COMPLETED | COMMUNITY
Start: 2018-10-25 | End: 2020-09-03

## 2020-09-03 NOTE — ASSESSMENT
[FreeTextEntry1] : 75 year is here for Medicare annual wellness exam. her cognitive function was normal. Please refer to an appropriate section above for a list of providers that are regularly involved in the patient's care. See above for full details of history and physical. The patient's care team was reviewed and documented above. Listed above are the preventative services for which the patient may be due. I informed the patient with a list and offered assistance in scheduling the appropriate exams.\par Labs sent.\par Flu vaccine given.\par Pt needs to see ENDO to eval for possible osteoporosis therapy.\par Support and encouragement provided.

## 2020-09-03 NOTE — HEALTH RISK ASSESSMENT
[Good] : ~his/her~ current health as good [No falls in past year] : Patient reported no falls in the past year [Fair] :  ~his/her~ mood as fair [0] : 2) Feeling down, depressed, or hopeless: Not at all (0) [Patient reported mammogram was normal] : Patient reported mammogram was normal [Patient reported PAP Smear was normal] : Patient reported PAP Smear was normal [Patient reported bone density results were abnormal] : Patient reported bone density results were abnormal [Patient reported colonoscopy was normal] : Patient reported colonoscopy was normal [Hepatitis C test declined] : Hepatitis C test declined [HIV test declined] : HIV test declined [With Family] : lives with family [None] : None [Feels Safe at Home] : Feels safe at home [Sexually Active] : sexually active [Fully functional (bathing, dressing, toileting, transferring, walking, feeding)] : Fully functional (bathing, dressing, toileting, transferring, walking, feeding) [Fully functional (using the telephone, shopping, preparing meals, housekeeping, doing laundry, using] : Fully functional and needs no help or supervision to perform IADLs (using the telephone, shopping, preparing meals, housekeeping, doing laundry, using transportation, managing medications and managing finances) [Seat Belt] :  uses seat belt [Sunscreen] : uses sunscreen [[id number]] : 0 [Language] : denies difficulty with language [Change in mental status noted] : No change in mental status noted [Behavior] : denies difficulty with behavior [Learning/Retaining New Information] : denies difficulty learning/retaining new information [Reasoning] : denies difficulty with reasoning [Handling Complex Tasks] : denies difficulty handling complex tasks [Spatial Ability and Orientation] : denies difficulty with spatial ability and orientation [High Risk Behavior] : no high risk behavior [] :  [Reports changes in hearing] : Reports no changes in hearing [Reports changes in vision] : Reports no changes in vision [MammogramDate] : 12/19 [PapSmearDate] : 01/20 [BoneDensityDate] : 02/20 [ColonoscopyDate] : 05/17

## 2020-09-03 NOTE — PHYSICAL EXAM
[No Acute Distress] : no acute distress [Well Developed] : well developed [Well Nourished] : well nourished [Well-Appearing] : well-appearing [Normal Sclera/Conjunctiva] : normal sclera/conjunctiva [PERRL] : pupils equal round and reactive to light [EOMI] : extraocular movements intact [No JVD] : no jugular venous distention [Normal Oropharynx] : the oropharynx was normal [Normal Outer Ear/Nose] : the outer ears and nose were normal in appearance [No Lymphadenopathy] : no lymphadenopathy [Supple] : supple [Thyroid Normal, No Nodules] : the thyroid was normal and there were no nodules present [No Respiratory Distress] : no respiratory distress  [No Accessory Muscle Use] : no accessory muscle use [Clear to Auscultation] : lungs were clear to auscultation bilaterally [Normal Rate] : normal rate  [No Murmur] : no murmur heard [Regular Rhythm] : with a regular rhythm [Normal S1, S2] : normal S1 and S2 [No Varicosities] : no varicosities [No Abdominal Bruit] : a ~M bruit was not heard ~T in the abdomen [No Carotid Bruits] : no carotid bruits [No Edema] : there was no peripheral edema [Pedal Pulses Present] : the pedal pulses are present [No Palpable Aorta] : no palpable aorta [No Extremity Clubbing/Cyanosis] : no extremity clubbing/cyanosis [Non-distended] : non-distended [Soft] : abdomen soft [Non Tender] : non-tender [No HSM] : no HSM [Normal Bowel Sounds] : normal bowel sounds [No CVA Tenderness] : no CVA  tenderness [Normal Anterior Cervical Nodes] : no anterior cervical lymphadenopathy [Normal Posterior Cervical Nodes] : no posterior cervical lymphadenopathy [No Joint Swelling] : no joint swelling [Grossly Normal Strength/Tone] : grossly normal strength/tone [No Spinal Tenderness] : no spinal tenderness [No Focal Deficits] : no focal deficits [No Rash] : no rash [Coordination Grossly Intact] : coordination grossly intact [Normal Gait] : normal gait [Normal Affect] : the affect was normal [Normal Insight/Judgement] : insight and judgment were intact [Normal Appearance] : normal in appearance [No Nipple Discharge] : no nipple discharge [No Masses] : no palpable masses

## 2020-09-03 NOTE — HISTORY OF PRESENT ILLNESS
[de-identified] : 76 y/o female presents for Annual Wellness exam.\par Saw Neuro this summer: "Mrs. Looney is seen in followup for her mixed headache syndrome. She has been doing reasonably well from the headache standpoint. She has not had any migraines. She has occasional tension headaches. Her main problem now is her anxiety. She continues to have a stress over her perception of her husbands alcoholism. She is further stressed by the isolation of the current pandemic. She has isolated herself in her home in [place]. She uses Xanax for sleep. As with most of the prior visits, the entire visit was spent dealing with her anxiety and emotional concerns. I have given her instructions on mindfulness and on breathing techniques. I have also reinforced the need to take special care with controlled substances. We will have a virtual visit in 3 months, because she is concerned about going out."\par Today, much of visit spent on emotional state. This "pandemic is too stressful". Worried about daughter who is physician.\par She was given Xanax #90 ~1month ago.\par \par On statin-alternates 10 mg/20 mg.\par \par Had DEXA this winter- gave her ENDO referral but she didn't go.\par \par

## 2020-09-04 LAB
25(OH)D3 SERPL-MCNC: 51.9 NG/ML
ALBUMIN SERPL ELPH-MCNC: 4.4 G/DL
ALP BLD-CCNC: 99 U/L
ALT SERPL-CCNC: 12 U/L
ANION GAP SERPL CALC-SCNC: 12 MMOL/L
AST SERPL-CCNC: 21 U/L
BASOPHILS # BLD AUTO: 0.06 K/UL
BASOPHILS NFR BLD AUTO: 0.8 %
BILIRUB SERPL-MCNC: <0.2 MG/DL
BUN SERPL-MCNC: 24 MG/DL
CALCIUM SERPL-MCNC: 9.4 MG/DL
CHLORIDE SERPL-SCNC: 104 MMOL/L
CHOLEST SERPL-MCNC: 204 MG/DL
CHOLEST/HDLC SERPL: 2.9 RATIO
CK SERPL-CCNC: 88 U/L
CO2 SERPL-SCNC: 26 MMOL/L
CREAT SERPL-MCNC: 1.01 MG/DL
EOSINOPHIL # BLD AUTO: 0.08 K/UL
EOSINOPHIL NFR BLD AUTO: 1.1 %
ESTIMATED AVERAGE GLUCOSE: 117 MG/DL
GLUCOSE SERPL-MCNC: 99 MG/DL
HBA1C MFR BLD HPLC: 5.7 %
HCT VFR BLD CALC: 40.7 %
HDLC SERPL-MCNC: 71 MG/DL
HGB BLD-MCNC: 12.6 G/DL
IMM GRANULOCYTES NFR BLD AUTO: 0.3 %
LDLC SERPL CALC-MCNC: 120 MG/DL
LYMPHOCYTES # BLD AUTO: 3.04 K/UL
LYMPHOCYTES NFR BLD AUTO: 42.7 %
MAN DIFF?: NORMAL
MCHC RBC-ENTMCNC: 30.7 PG
MCHC RBC-ENTMCNC: 31 GM/DL
MCV RBC AUTO: 99 FL
MONOCYTES # BLD AUTO: 0.62 K/UL
MONOCYTES NFR BLD AUTO: 8.7 %
NEUTROPHILS # BLD AUTO: 3.3 K/UL
NEUTROPHILS NFR BLD AUTO: 46.4 %
PLATELET # BLD AUTO: 267 K/UL
POTASSIUM SERPL-SCNC: 4.3 MMOL/L
PROT SERPL-MCNC: 6.4 G/DL
RBC # BLD: 4.11 M/UL
RBC # FLD: 12.8 %
SODIUM SERPL-SCNC: 141 MMOL/L
T3 SERPL-MCNC: 114 NG/DL
T4 FREE SERPL-MCNC: 1 NG/DL
TRIGL SERPL-MCNC: 67 MG/DL
TSH SERPL-ACNC: 4.59 UIU/ML
VIT B12 SERPL-MCNC: 1031 PG/ML
WBC # FLD AUTO: 7.12 K/UL

## 2020-10-14 ENCOUNTER — APPOINTMENT (OUTPATIENT)
Dept: OTOLARYNGOLOGY | Facility: CLINIC | Age: 75
End: 2020-10-14
Payer: MEDICARE

## 2020-10-14 VITALS
SYSTOLIC BLOOD PRESSURE: 108 MMHG | DIASTOLIC BLOOD PRESSURE: 71 MMHG | HEART RATE: 99 BPM | OXYGEN SATURATION: 98 % | TEMPERATURE: 98 F

## 2020-10-14 PROCEDURE — 99213 OFFICE O/P EST LOW 20 MIN: CPT

## 2020-10-14 NOTE — HISTORY OF PRESENT ILLNESS
[de-identified] : followup 74 yo woman with h/o allergic rhinitis in the past has relocated to [place] and developed nasal congestion and clear mucoid drainage when the leaves started to fall from the trees. She reports that she has made her home dust free. Nonsmoker. Got relief from Nasonex last yr and she wanted new script. eyes tear and face gets somewhat itchy also.\par Sx are moderate in severity. Advised to come in when she called for refill because it has been 1.5 yrs since she was seen last.

## 2020-10-14 NOTE — PHYSICAL EXAM
[de-identified] : ar [de-identified] : ar [Normal] : no masses and lesions seen, face is symmetric

## 2020-10-29 ENCOUNTER — APPOINTMENT (OUTPATIENT)
Dept: ENDOCRINOLOGY | Facility: CLINIC | Age: 75
End: 2020-10-29
Payer: MEDICARE

## 2020-10-29 VITALS — BODY MASS INDEX: 20.83 KG/M2 | WEIGHT: 125 LBS | HEIGHT: 65 IN

## 2020-10-29 PROCEDURE — 99204 OFFICE O/P NEW MOD 45 MIN: CPT | Mod: 95

## 2020-10-29 RX ORDER — MOMETASONE 50 UG/1
50 SPRAY, METERED NASAL DAILY
Qty: 1 | Refills: 1 | Status: DISCONTINUED | COMMUNITY
Start: 2019-04-03 | End: 2020-10-29

## 2020-10-29 NOTE — PHYSICAL EXAM
[Alert] : alert [Healthy Appearance] : healthy appearance [Normal Affect] : the affect was normal [Normal Mood] : the mood was normal

## 2020-10-30 NOTE — DATA REVIEWED
[FreeTextEntry1] : 9/20: TSH 4.59,  tot chol 204, trig 67, HDL 71, , 25D 51.9, A1c 5.7%\par \par bone density, 2/20:\par spine T -2.1 (L3 T -2.9)\par tot hip T -2.4\par fem neck 0.584, T -2.4 \par FRAX score: fx risk 13%, hip fx risk 3.0%

## 2020-10-30 NOTE — REASON FOR VISIT
[Medical Office: ([facility])___] : at the medical office located in  [Verbal consent obtained from patient] : the patient, [unfilled] [Initial Evaluation] : an initial evaluation [Osteoporosis] : osteoporosis [Home] : at home, [unfilled] , at the time of the visit. [FreeTextEntry2] : Dr Mindy Amaya

## 2020-10-30 NOTE — HISTORY OF PRESENT ILLNESS
[FreeTextEntry1] : Was told bone density is  "borderline" for osteoporosis \par She was treated with alendronate for about 10 years, from her mid 50s to mid 60s. \par no history of fractures.  no FH of hip fracture.  no height loss.\par menopause at average age, around 48-50 years old.\par She takes calcium and vitamin D and has a healthy diet with a lot of lean protein, veg and fruit.\par She does not drink alcohol or soda.  \par She does weight bearing exercises. She has a [place] in [place] [address] and is able to walk 2.5 miles a day.\par  She is not prone to falls. \par She did not like taking alendronate and does not want to go back on it.\par \par PMH: migraine HA, 3-4 in the past 6 months\par \par Meds:\par atorvastatin 10mg\par Vagifem\par Nasonex\par nortriptyline, Maxalt prn\par Xanax 0.5mg prn\par supplements:  Ca/D, glucosamine, Mvi, vit C, Vit E, Zinc

## 2020-10-30 NOTE — ASSESSMENT
[FreeTextEntry1] : Osteoporosis. (high FRAX score)\par Reviewed bone density results with patient, explained that T score is right at the osteoporosis cutoff but the fracture risk score (FRAX) is above the treatment threshold (over 3% for hip fracture risk) so I would recommend restarting treatment for osteoporosis.\par Discussed treatment options including oral bisphosphonate (monthly, if she does not want alendronate) or iv bisphosphonate;  she prefers iv bisphosphonate (Reclast) and she will be back in [facility] on Dec 2 for her mammogram appointment so will try to schedule infusion on that day.  For iv Reclast, treatment course is typically 3 doses, once yearly, but I will follow bone markers (if they remain very low, can space out the infusions longer than a year) and if repeat bone density is significantly improved after 2 infusions (bone density repeat in 2 years), we can stop after two treatments. Discussed possible acute phase reaction after bisphosphonate infusion, though this is typically after first dose/exposure to bisphosphonate (since she had taken alendronate before, this won't be her first exposure) but she can take Tylenol on day of infusion and following 1-2 days to prevent reaction (low grade fever, fatigue, myalgia).\par Discussed calcium supplementation with patient.  Total recommended dietary calcium intake is 1200mg  but she only has to supplement what she doesn't get in her diet. I recommend getting half of calcium requirement from diet (through vegetable, fish, dairy, aim for 3 servings/day) and half from supplementation (500-600mg calcium carbonate, take with food).  Continue vitamin D supplementation (currently replete).\par will send lab form for labs (required within one month of infusion, per infusion center) and for baseline bone marker.\par RTO summer [id number]\par

## 2020-11-12 ENCOUNTER — APPOINTMENT (OUTPATIENT)
Dept: NEUROLOGY | Facility: CLINIC | Age: 75
End: 2020-11-12
Payer: MEDICARE

## 2020-11-12 VITALS
SYSTOLIC BLOOD PRESSURE: 124 MMHG | WEIGHT: 130 LBS | HEART RATE: 97 BPM | HEIGHT: 65 IN | DIASTOLIC BLOOD PRESSURE: 62 MMHG | BODY MASS INDEX: 21.66 KG/M2

## 2020-11-12 VITALS — TEMPERATURE: 97.4 F

## 2020-11-12 DIAGNOSIS — F41.9 ANXIETY DISORDER, UNSPECIFIED: ICD-10-CM

## 2020-11-12 DIAGNOSIS — F51.05 ANXIETY DISORDER, UNSPECIFIED: ICD-10-CM

## 2020-11-12 PROCEDURE — 99214 OFFICE O/P EST MOD 30 MIN: CPT

## 2020-11-12 NOTE — HISTORY OF PRESENT ILLNESS
[FreeTextEntry1] : Mrs. Looney is seen in followup for her mixed headache syndrome. She has had only 3 migraine headaches since July. They all responded well to rizatriptan. Her tension headaches have been under better control, since the election. Her social issues remain unchanged.

## 2020-11-12 NOTE — ASSESSMENT
[FreeTextEntry1] : Mrs. Looney is seen in followup for her mixed headache syndrome. She has had only 3 migraine headaches since July. They all responded well to rizatriptan. Her tension headaches have been under better control, since the election. Her social issues remain unchanged.\par As on prior visits, almost the entire half hour was taken up by discussing her social issues. She perceives her  to be an alcoholic. Her grandson has Crohn's disease. We have discussed coping strategies.

## 2020-11-15 ENCOUNTER — RX RENEWAL (OUTPATIENT)
Age: 75
End: 2020-11-15

## 2020-12-02 ENCOUNTER — APPOINTMENT (OUTPATIENT)
Dept: ULTRASOUND IMAGING | Facility: CLINIC | Age: 75
End: 2020-12-02
Payer: MEDICARE

## 2020-12-02 ENCOUNTER — OUTPATIENT (OUTPATIENT)
Dept: OUTPATIENT SERVICES | Facility: HOSPITAL | Age: 75
LOS: 1 days | End: 2020-12-02

## 2020-12-02 ENCOUNTER — APPOINTMENT (OUTPATIENT)
Dept: MAMMOGRAPHY | Facility: CLINIC | Age: 75
End: 2020-12-02
Payer: MEDICARE

## 2020-12-02 PROCEDURE — 77063 BREAST TOMOSYNTHESIS BI: CPT | Mod: 26

## 2020-12-02 PROCEDURE — 77067 SCR MAMMO BI INCL CAD: CPT | Mod: 26

## 2020-12-02 PROCEDURE — 76641 ULTRASOUND BREAST COMPLETE: CPT | Mod: 26,50

## 2020-12-03 ENCOUNTER — APPOINTMENT (OUTPATIENT)
Dept: ORTHOPEDIC SURGERY | Facility: CLINIC | Age: 75
End: 2020-12-03

## 2020-12-09 ENCOUNTER — APPOINTMENT (OUTPATIENT)
Age: 75
End: 2020-12-09

## 2020-12-09 ENCOUNTER — OUTPATIENT (OUTPATIENT)
Dept: OUTPATIENT SERVICES | Facility: HOSPITAL | Age: 75
LOS: 1 days | End: 2020-12-09
Payer: MEDICARE

## 2020-12-09 DIAGNOSIS — M81.0 AGE-RELATED OSTEOPOROSIS WITHOUT CURRENT PATHOLOGICAL FRACTURE: ICD-10-CM

## 2020-12-09 PROCEDURE — 96365 THER/PROPH/DIAG IV INF INIT: CPT

## 2020-12-09 RX ORDER — ZOLEDRONIC ACID 5 MG/100ML
5 INJECTION, SOLUTION INTRAVENOUS ONCE
Refills: 0 | Status: COMPLETED | OUTPATIENT
Start: 2020-12-09 | End: 2020-12-09

## 2020-12-09 RX ORDER — ACETAMINOPHEN 500 MG
650 TABLET ORAL ONCE
Refills: 0 | Status: COMPLETED | OUTPATIENT
Start: 2020-12-09 | End: 2020-12-09

## 2020-12-09 RX ADMIN — ZOLEDRONIC ACID 5 MILLIGRAM(S): 5 INJECTION, SOLUTION INTRAVENOUS at 15:30

## 2020-12-09 RX ADMIN — ZOLEDRONIC ACID 200 MILLIGRAM(S): 5 INJECTION, SOLUTION INTRAVENOUS at 15:00

## 2020-12-15 ENCOUNTER — OUTPATIENT (OUTPATIENT)
Dept: OUTPATIENT SERVICES | Facility: HOSPITAL | Age: 75
LOS: 1 days | End: 2020-12-15

## 2020-12-15 ENCOUNTER — APPOINTMENT (OUTPATIENT)
Dept: MAMMOGRAPHY | Facility: CLINIC | Age: 75
End: 2020-12-15
Payer: MEDICARE

## 2020-12-15 ENCOUNTER — APPOINTMENT (OUTPATIENT)
Dept: ULTRASOUND IMAGING | Facility: CLINIC | Age: 75
End: 2020-12-15
Payer: MEDICARE

## 2020-12-15 PROCEDURE — 77065 DX MAMMO INCL CAD UNI: CPT | Mod: 26,LT

## 2020-12-15 PROCEDURE — G0279: CPT | Mod: 26

## 2020-12-23 ENCOUNTER — NON-APPOINTMENT (OUTPATIENT)
Age: 75
End: 2020-12-23

## 2021-01-24 ENCOUNTER — RX RENEWAL (OUTPATIENT)
Age: 76
End: 2021-01-24

## 2021-02-11 ENCOUNTER — NON-APPOINTMENT (OUTPATIENT)
Age: 76
End: 2021-02-11

## 2021-04-08 ENCOUNTER — RX RENEWAL (OUTPATIENT)
Age: 76
End: 2021-04-08

## 2021-04-13 ENCOUNTER — APPOINTMENT (OUTPATIENT)
Dept: INTERNAL MEDICINE | Facility: CLINIC | Age: 76
End: 2021-04-13
Payer: MEDICARE

## 2021-04-13 ENCOUNTER — NON-APPOINTMENT (OUTPATIENT)
Age: 76
End: 2021-04-13

## 2021-04-13 VITALS
HEART RATE: 97 BPM | DIASTOLIC BLOOD PRESSURE: 62 MMHG | WEIGHT: 130 LBS | BODY MASS INDEX: 21.66 KG/M2 | OXYGEN SATURATION: 97 % | HEIGHT: 65 IN | SYSTOLIC BLOOD PRESSURE: 110 MMHG | TEMPERATURE: 98 F

## 2021-04-13 PROCEDURE — 99214 OFFICE O/P EST MOD 30 MIN: CPT | Mod: 25

## 2021-04-13 PROCEDURE — 36415 COLL VENOUS BLD VENIPUNCTURE: CPT

## 2021-04-13 RX ORDER — NORTRIPTYLINE HYDROCHLORIDE 25 MG/1
25 CAPSULE ORAL
Qty: 180 | Refills: 2 | Status: COMPLETED | COMMUNITY
Start: 2020-02-12 | End: 2021-04-13

## 2021-04-13 RX ORDER — NAPROXEN SODIUM 550 MG/1
550 TABLET ORAL
Qty: 20 | Refills: 1 | Status: COMPLETED | COMMUNITY
Start: 2021-02-11 | End: 2021-04-13

## 2021-04-13 NOTE — HISTORY OF PRESENT ILLNESS
[de-identified] : 74 y/o female with osteoporosis, HAs, elevated TSH, high lipids presents for f/u.\par \par Seeing Dr Trevino for osteoporosis-had Reclast inf in 12/20.\par \par Had migraine x 2 weeks after Pfizer vaccine. Okay now. Daughter is urgent care doc and tested her antibodies which were high.\par \par Developed seasonal allergies this year after spending bulk of pandemic in [place].\par \par Feeling good today.\par \par \par

## 2021-04-13 NOTE — ASSESSMENT
[FreeTextEntry1] : 74 y/o female presents for f/u.\par \par Labs sent.\par \par Explained that there isn't at this point clincal utility in tracking COVID AB titers.\par \par Continue current medication regimen.\par \par \par

## 2021-04-13 NOTE — PHYSICAL EXAM
[No Acute Distress] : no acute distress [Normal Sclera/Conjunctiva] : normal sclera/conjunctiva [No JVD] : no jugular venous distention [No Respiratory Distress] : no respiratory distress  [Normal Affect] : the affect was normal

## 2021-04-14 LAB
ALBUMIN SERPL ELPH-MCNC: 4.4 G/DL
ALP BLD-CCNC: 88 U/L
ALT SERPL-CCNC: 12 U/L
ANION GAP SERPL CALC-SCNC: 13 MMOL/L
AST SERPL-CCNC: 23 U/L
BILIRUB SERPL-MCNC: <0.2 MG/DL
BUN SERPL-MCNC: 23 MG/DL
CALCIUM SERPL-MCNC: 9.4 MG/DL
CHLORIDE SERPL-SCNC: 105 MMOL/L
CHOLEST SERPL-MCNC: 205 MG/DL
CK SERPL-CCNC: 79 U/L
CO2 SERPL-SCNC: 22 MMOL/L
CREAT SERPL-MCNC: 0.87 MG/DL
GLUCOSE SERPL-MCNC: 98 MG/DL
HDLC SERPL-MCNC: 64 MG/DL
LDLC SERPL CALC-MCNC: 113 MG/DL
NONHDLC SERPL-MCNC: 141 MG/DL
POTASSIUM SERPL-SCNC: 4.8 MMOL/L
PROT SERPL-MCNC: 6.5 G/DL
SODIUM SERPL-SCNC: 140 MMOL/L
TRIGL SERPL-MCNC: 138 MG/DL
TSH SERPL-ACNC: 4.26 UIU/ML

## 2021-05-04 ENCOUNTER — RX RENEWAL (OUTPATIENT)
Age: 76
End: 2021-05-04

## 2021-05-06 ENCOUNTER — APPOINTMENT (OUTPATIENT)
Dept: NEUROLOGY | Facility: CLINIC | Age: 76
End: 2021-05-06
Payer: MEDICARE

## 2021-05-06 VITALS
HEIGHT: 65 IN | BODY MASS INDEX: 21.66 KG/M2 | SYSTOLIC BLOOD PRESSURE: 126 MMHG | DIASTOLIC BLOOD PRESSURE: 82 MMHG | HEART RATE: 92 BPM | WEIGHT: 130 LBS

## 2021-05-06 VITALS — TEMPERATURE: 97.6 F

## 2021-05-06 PROCEDURE — 99214 OFFICE O/P EST MOD 30 MIN: CPT

## 2021-05-06 NOTE — ASSESSMENT
[FreeTextEntry1] : Mrs. 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  She had a severe exacerbation following her second Covid vaccination.  That has resolved.  She often uses an admixture of rizatriptan, naproxen, and Esgic.  She takes nortriptyline on a regular basis at bedtime.  She has tried to cut that down, but had an exacerbation.  Her headaches are again under control.\par Her examination remains entirely normal.  As on many prior visits, we discussed her social issues.  The visit took half an hour.  She will try to get her prescriptions in [place], so that I can monitor the controlled substances.  She would like to resume her prior regimen and I agree.  Of note is there also seems to be some component of occipital neuralgia.  I have checked the [facility] website and there have been no unauthorized prescriptions.

## 2021-05-06 NOTE — HISTORY OF PRESENT ILLNESS
[FreeTextEntry1] : Mrs. 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  She had a severe exacerbation following her second Covid vaccination.  That has resolved.  She often uses an admixture of rizatriptan, naproxen, and Esgic.  She takes nortriptyline on a regular basis at bedtime.  She has tried to cut that down, but had an exacerbation.  Her headaches are again under control.

## 2021-05-31 ENCOUNTER — RX RENEWAL (OUTPATIENT)
Age: 76
End: 2021-05-31

## 2021-06-28 ENCOUNTER — RX RENEWAL (OUTPATIENT)
Age: 76
End: 2021-06-28

## 2021-07-06 ENCOUNTER — RX RENEWAL (OUTPATIENT)
Age: 76
End: 2021-07-06

## 2021-07-23 ENCOUNTER — RX RENEWAL (OUTPATIENT)
Age: 76
End: 2021-07-23

## 2021-07-28 ENCOUNTER — NON-APPOINTMENT (OUTPATIENT)
Age: 76
End: 2021-07-28

## 2021-08-03 ENCOUNTER — NON-APPOINTMENT (OUTPATIENT)
Age: 76
End: 2021-08-03

## 2021-08-03 RX ORDER — NORTRIPTYLINE HYDROCHLORIDE 10 MG/1
10 CAPSULE ORAL
Qty: 180 | Refills: 0 | Status: DISCONTINUED | COMMUNITY
Start: 2021-05-06 | End: 2021-08-03

## 2021-08-17 ENCOUNTER — RX RENEWAL (OUTPATIENT)
Age: 76
End: 2021-08-17

## 2021-09-12 ENCOUNTER — RX RENEWAL (OUTPATIENT)
Age: 76
End: 2021-09-12

## 2021-09-12 RX ORDER — MOMETASONE 50 UG/1
50 SPRAY, METERED NASAL
Qty: 1 | Refills: 0 | Status: ACTIVE | COMMUNITY
Start: 2020-10-14 | End: 1900-01-01

## 2021-09-30 ENCOUNTER — RX RENEWAL (OUTPATIENT)
Age: 76
End: 2021-09-30

## 2021-10-11 ENCOUNTER — RX RENEWAL (OUTPATIENT)
Age: 76
End: 2021-10-11

## 2021-10-15 ENCOUNTER — RX RENEWAL (OUTPATIENT)
Age: 76
End: 2021-10-15

## 2021-10-21 ENCOUNTER — NON-APPOINTMENT (OUTPATIENT)
Age: 76
End: 2021-10-21

## 2021-10-21 ENCOUNTER — APPOINTMENT (OUTPATIENT)
Dept: INTERNAL MEDICINE | Facility: CLINIC | Age: 76
End: 2021-10-21
Payer: MEDICARE

## 2021-10-21 VITALS
OXYGEN SATURATION: 97 % | BODY MASS INDEX: 22.33 KG/M2 | HEART RATE: 86 BPM | SYSTOLIC BLOOD PRESSURE: 106 MMHG | TEMPERATURE: 97.2 F | HEIGHT: 65 IN | DIASTOLIC BLOOD PRESSURE: 72 MMHG | WEIGHT: 134 LBS

## 2021-10-21 DIAGNOSIS — T50.B95A ADVERSE EFFECT OF OTHER VIRAL VACCINES, INITIAL ENCOUNTER: ICD-10-CM

## 2021-10-21 PROCEDURE — 36415 COLL VENOUS BLD VENIPUNCTURE: CPT

## 2021-10-21 PROCEDURE — 99213 OFFICE O/P EST LOW 20 MIN: CPT | Mod: 25

## 2021-10-21 PROCEDURE — G0439: CPT

## 2021-10-21 PROCEDURE — 93000 ELECTROCARDIOGRAM COMPLETE: CPT

## 2021-10-21 NOTE — ASSESSMENT
[FreeTextEntry1] : 76 year is here for Medicare annual wellness exam. her cognitive function was normal. Please refer to an appropriate section above for a list of providers that are regularly involved in the patient's care. See above for full details of history and physical. The patient's care team was reviewed and documented above. Listed above are the preventative services for which the patient may be due. I informed the patient with a list and offered assistance in scheduling the appropriate exams.\par Labs sent including bone turnover markers so that I may send to Dr Trevino and we can decide on Reclast.\par I urged her to have back films but she declined since it's "improving.".\par Send COVID AB despite my counselling that there is no clinical utility in checking. already boosted.\par Send RF to r/o RA as cause of hand arthirits, but likley is due to overuse. Advised more ergonomical way to use IPHONE/IPAD.\par \par

## 2021-10-21 NOTE — HISTORY OF PRESENT ILLNESS
[de-identified] : 77 y/o female presents for Annual Wellness exam.\par Fell on buttocks 3 weeks ago.\par Didn't get xray because "what can you do about it?" Still painful but improving slowly.\par \par Had Reclast infusion last winter (December). Doesn't know if she is getting anyother one. Hasn't spoken to ENDO. I read Dr Trevino's note. She was planning to base decision on bone turnover markers which I will order. Plan was to space out infusions more if these were normal. 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 IIntermittently swolloen and painful. Father had severe OA. \par \par Had COVID booster. Had flu shot. Wants COVID AB testing even thought she understands there is no clinical utility in checking.\par

## 2021-10-21 NOTE — REVIEW OF SYSTEMS
[Negative] : Heme/Lymph [Joint Stiffness] : joint stiffness [Joint Swelling] : joint swelling [FreeTextEntry9] : coccyx pain

## 2021-10-21 NOTE — HEALTH RISK ASSESSMENT
[Good] : ~his/her~  mood as  good [0] : 2) Feeling down, depressed, or hopeless: Not at all (0) [PHQ-2 Negative - No further assessment needed] : PHQ-2 Negative - No further assessment needed [Patient reported mammogram was normal] : Patient reported mammogram was normal [HIV test declined] : HIV test declined [Hepatitis C test declined] : Hepatitis C test declined [Any fall with injury in past year] : Patient reported fall with injury in the past year [With Significant Other] : lives with significant other [Retired] : retired [] :  [Sexually Active] : sexually active [Feels Safe at Home] : Feels safe at home [Fully functional (bathing, dressing, toileting, transferring, walking, feeding)] : Fully functional (bathing, dressing, toileting, transferring, walking, feeding) [Fully functional (using the telephone, shopping, preparing meals, housekeeping, doing laundry, using] : Fully functional and needs no help or supervision to perform IADLs (using the telephone, shopping, preparing meals, housekeeping, doing laundry, using transportation, managing medications and managing finances) [Seat Belt] :  uses seat belt [Sunscreen] : uses sunscreen [[id number]] : 0 [Change in mental status noted] : No change in mental status noted [Language] : denies difficulty with language [Behavior] : denies difficulty with behavior [Learning/Retaining New Information] : denies difficulty learning/retaining new information [Handling Complex Tasks] : denies difficulty handling complex tasks [Reasoning] : denies difficulty with reasoning [Spatial Ability and Orientation] : denies difficulty with spatial ability and orientation [High Risk Behavior] : no high risk behavior [Reports changes in hearing] : Reports no changes in hearing [Reports changes in vision] : Reports no changes in vision [MammogramDate] : 11/20

## 2021-10-21 NOTE — PHYSICAL EXAM
[No Acute Distress] : no acute distress [Well Nourished] : well nourished [Well Developed] : well developed [Well-Appearing] : well-appearing [Normal Sclera/Conjunctiva] : normal sclera/conjunctiva [PERRL] : pupils equal round and reactive to light [EOMI] : extraocular movements intact [Normal Outer Ear/Nose] : the outer ears and nose were normal in appearance [Normal Oropharynx] : the oropharynx was normal [No JVD] : no jugular venous distention [No Lymphadenopathy] : no lymphadenopathy [Supple] : supple [Thyroid Normal, No Nodules] : the thyroid was normal and there were no nodules present [No Respiratory Distress] : no respiratory distress  [No Accessory Muscle Use] : no accessory muscle use [Clear to Auscultation] : lungs were clear to auscultation bilaterally [Normal Rate] : normal rate  [Regular Rhythm] : with a regular rhythm [Normal S1, S2] : normal S1 and S2 [No Murmur] : no murmur heard [No Carotid Bruits] : no carotid bruits [No Abdominal Bruit] : a ~M bruit was not heard ~T in the abdomen [No Varicosities] : no varicosities [Pedal Pulses Present] : the pedal pulses are present [No Edema] : there was no peripheral edema [No Palpable Aorta] : no palpable aorta [No Extremity Clubbing/Cyanosis] : no extremity clubbing/cyanosis [Normal Appearance] : normal in appearance [No Nipple Discharge] : no nipple discharge [Soft] : abdomen soft [Non Tender] : non-tender [Non-distended] : non-distended [No Masses] : no abdominal mass palpated [No HSM] : no HSM [Normal Bowel Sounds] : normal bowel sounds [Normal Posterior Cervical Nodes] : no posterior cervical lymphadenopathy [Normal Anterior Cervical Nodes] : no anterior cervical lymphadenopathy [No CVA Tenderness] : no CVA  tenderness [No Spinal Tenderness] : no spinal tenderness [No Joint Swelling] : no joint swelling [Grossly Normal Strength/Tone] : grossly normal strength/tone [No Rash] : no rash [Coordination Grossly Intact] : coordination grossly intact [No Focal Deficits] : no focal deficits [Normal Gait] : normal gait [Deep Tendon Reflexes (DTR)] : deep tendon reflexes were 2+ and symmetric [Normal Affect] : the affect was normal [Normal Insight/Judgement] : insight and judgment were intact [de-identified] : tender at coccyx, thumb with OA changes

## 2021-10-22 LAB
25(OH)D3 SERPL-MCNC: 48.3 NG/ML
ALBUMIN SERPL ELPH-MCNC: 4.5 G/DL
ALP BLD-CCNC: 91 U/L
ALT SERPL-CCNC: 15 U/L
ANION GAP SERPL CALC-SCNC: 13 MMOL/L
AST SERPL-CCNC: 31 U/L
BASOPHILS # BLD AUTO: 0.06 K/UL
BASOPHILS NFR BLD AUTO: 0.9 %
BILIRUB SERPL-MCNC: 0.2 MG/DL
BUN SERPL-MCNC: 17 MG/DL
CALCIUM SERPL-MCNC: 9.6 MG/DL
CHLORIDE SERPL-SCNC: 102 MMOL/L
CHOLEST SERPL-MCNC: 224 MG/DL
CK SERPL-CCNC: 85 U/L
CO2 SERPL-SCNC: 23 MMOL/L
CREAT SERPL-MCNC: 0.84 MG/DL
EOSINOPHIL # BLD AUTO: 0.09 K/UL
EOSINOPHIL NFR BLD AUTO: 1.4 %
ESTIMATED AVERAGE GLUCOSE: 117 MG/DL
GLUCOSE SERPL-MCNC: 86 MG/DL
HBA1C MFR BLD HPLC: 5.7 %
HCT VFR BLD CALC: 42 %
HDLC SERPL-MCNC: 67 MG/DL
HGB BLD-MCNC: 12.8 G/DL
IMM GRANULOCYTES NFR BLD AUTO: 0.2 %
LDLC SERPL CALC-MCNC: 133 MG/DL
LYMPHOCYTES # BLD AUTO: 2.39 K/UL
LYMPHOCYTES NFR BLD AUTO: 35.9 %
MAN DIFF?: NORMAL
MCHC RBC-ENTMCNC: 30.5 GM/DL
MCHC RBC-ENTMCNC: 30.8 PG
MCV RBC AUTO: 101 FL
MONOCYTES # BLD AUTO: 0.59 K/UL
MONOCYTES NFR BLD AUTO: 8.9 %
NEUTROPHILS # BLD AUTO: 3.52 K/UL
NEUTROPHILS NFR BLD AUTO: 52.7 %
NONHDLC SERPL-MCNC: 157 MG/DL
PLATELET # BLD AUTO: 317 K/UL
POTASSIUM SERPL-SCNC: 4.9 MMOL/L
PROT SERPL-MCNC: 6.7 G/DL
RBC # BLD: 4.16 M/UL
RBC # FLD: 13.3 %
RHEUMATOID FACT SER QL: <10 IU/ML
SODIUM SERPL-SCNC: 138 MMOL/L
T3 SERPL-MCNC: 124 NG/DL
T4 FREE SERPL-MCNC: 1.2 NG/DL
TRIGL SERPL-MCNC: 120 MG/DL
TSH SERPL-ACNC: 3.55 UIU/ML
VIT B12 SERPL-MCNC: 1075 PG/ML
WBC # FLD AUTO: 6.66 K/UL

## 2021-10-26 LAB — ALP BONE SERPL-MCNC: 13.1 UG/L

## 2021-10-28 LAB — COLLAGEN CTX SERPL-MCNC: 205 PG/ML

## 2021-11-11 ENCOUNTER — APPOINTMENT (OUTPATIENT)
Dept: NEUROLOGY | Facility: CLINIC | Age: 76
End: 2021-11-11
Payer: MEDICARE

## 2021-11-11 VITALS
HEIGHT: 65 IN | BODY MASS INDEX: 22.33 KG/M2 | WEIGHT: 134 LBS | SYSTOLIC BLOOD PRESSURE: 124 MMHG | DIASTOLIC BLOOD PRESSURE: 76 MMHG | HEART RATE: 87 BPM

## 2021-11-11 PROCEDURE — 99214 OFFICE O/P EST MOD 30 MIN: CPT

## 2021-11-11 RX ORDER — BUTALBITAL, ACETAMINOPHEN AND CAFFEINE 50; 325; 40 MG/1; MG/1; MG/1
50-325-40 CAPSULE ORAL
Qty: 120 | Refills: 1 | Status: DISCONTINUED | COMMUNITY
Start: 2021-05-06 | End: 2021-11-11

## 2021-11-11 RX ORDER — BUTALBITAL, ACETAMINOPHEN AND CAFFEINE 50; 325; 40 MG/1; MG/1; MG/1
50-325-40 CAPSULE ORAL
Qty: 120 | Refills: 0 | Status: DISCONTINUED | COMMUNITY
Start: 2021-05-12 | End: 2021-11-11

## 2021-11-11 NOTE — HISTORY OF PRESENT ILLNESS
[FreeTextEntry1] : Mrs. 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  As noted on numerous prior visits, she has both tension and migraine headaches.  She has had only about 2 migraine headaches per month.  They are easily controlled by rizatriptan.  Her social issues are again in the forefront.  She states that her  drinks to drunkenness and falls asleep at the table.  If she tries to confront him, there is a fight.  She has taken to simply absenting herself from the situation.  She does not wish to change medications and would like only a refill.  She is doing better on 50 mg of nortriptyline at bedtime.

## 2021-11-11 NOTE — ASSESSMENT
[FreeTextEntry1] : Mrs. 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  As noted on numerous prior visits, she has both tension and migraine headaches.  She has had only about 2 migraine headaches per month.  They are easily controlled by rizatriptan.  Her social issues are again in the forefront.  She states that her  drinks to drunkenness and falls asleep at the table.  If she tries to confront him, there is a fight.  She has taken to simply absenting herself from the situation.  She does not wish to change medications and would like only a refill.  She is doing better on 50 mg of nortriptyline at bedtime.\par As a new finding, the patient tells me that she tripped and fell on her buttocks, injuring her sacrum.  It was very painful for about 2 months.  At present, there are no signs of external injury.  Her examination remains normal.  I will renew her medications and see her in follow-up in 6 months.  I have renewed her nortriptyline 50 mg, rizatriptan, alprazolam, and butalbital.  She has had the appropriate warnings about habituation and abuse.  Because of the discussion of her medications and her marital issues, the visit took more than half an hour.

## 2021-12-14 ENCOUNTER — OUTPATIENT (OUTPATIENT)
Dept: OUTPATIENT SERVICES | Facility: HOSPITAL | Age: 76
LOS: 1 days | End: 2021-12-14

## 2021-12-14 ENCOUNTER — APPOINTMENT (OUTPATIENT)
Dept: MAMMOGRAPHY | Facility: CLINIC | Age: 76
End: 2021-12-14
Payer: MEDICARE

## 2021-12-14 PROCEDURE — 77063 BREAST TOMOSYNTHESIS BI: CPT | Mod: 26

## 2021-12-14 PROCEDURE — 76641 ULTRASOUND BREAST COMPLETE: CPT | Mod: 26,50

## 2021-12-14 PROCEDURE — 77067 SCR MAMMO BI INCL CAD: CPT | Mod: 26

## 2021-12-15 ENCOUNTER — RX RENEWAL (OUTPATIENT)
Age: 76
End: 2021-12-15

## 2021-12-23 ENCOUNTER — RX RENEWAL (OUTPATIENT)
Age: 76
End: 2021-12-23

## 2022-03-10 ENCOUNTER — RX RENEWAL (OUTPATIENT)
Age: 77
End: 2022-03-10

## 2022-04-06 ENCOUNTER — APPOINTMENT (OUTPATIENT)
Dept: INTERNAL MEDICINE | Facility: CLINIC | Age: 77
End: 2022-04-06
Payer: MEDICARE

## 2022-04-06 VITALS
HEIGHT: 65 IN | WEIGHT: 140 LBS | BODY MASS INDEX: 23.32 KG/M2 | DIASTOLIC BLOOD PRESSURE: 74 MMHG | TEMPERATURE: 97.7 F | HEART RATE: 85 BPM | OXYGEN SATURATION: 96 % | SYSTOLIC BLOOD PRESSURE: 120 MMHG

## 2022-04-06 DIAGNOSIS — J30.1 ALLERGIC RHINITIS DUE TO POLLEN: ICD-10-CM

## 2022-04-06 DIAGNOSIS — S30.0XXA CONTUSION OF LOWER BACK AND PELVIS, INITIAL ENCOUNTER: ICD-10-CM

## 2022-04-06 PROCEDURE — 36415 COLL VENOUS BLD VENIPUNCTURE: CPT

## 2022-04-06 PROCEDURE — 99214 OFFICE O/P EST MOD 30 MIN: CPT | Mod: 25

## 2022-04-06 RX ORDER — NAPROXEN SODIUM 550 MG/1
550 TABLET ORAL TWICE DAILY
Qty: 20 | Refills: 2 | Status: COMPLETED | COMMUNITY
Start: 2021-11-11 | End: 2022-04-06

## 2022-04-06 NOTE — HISTORY OF PRESENT ILLNESS
[de-identified] : 75 y/o female presents for f/u.\par Last seen for CPE in the fall.\par Saw Neuro in follow-up right after. No change to meds (nortriptyline 50 mg, rizatriptan, alprazolam, and butalbital).\par Never followed up with ENDO so isn't sure what to do about Reclast. Due for DEXA so will get that and then see her. She has only had 1 infusion as of yet and they had originally planned for two if DEXA improved. \par Concerned about getting COVID booster. Did well with booster but had 2 week migraine after 2nd dose. For booster, took ppx meds and hydrated. Naproxen helped with migraine the first time. \par Understands that she is developing OA.

## 2022-04-06 NOTE — ASSESSMENT
[FreeTextEntry1] : Doing well.\par Labs sent.\par Due for ENDO visit/DEXA.\par Start Tumeric for OA.

## 2022-04-06 NOTE — HEALTH RISK ASSESSMENT
[0] : 2) Feeling down, depressed, or hopeless: Not at all (0) [PHQ-2 Negative - No further assessment needed] : PHQ-2 Negative - No further assessment needed [[id number]] : 0

## 2022-04-06 NOTE — REVIEW OF SYSTEMS
[Joint Pain] : joint pain [Joint Stiffness] : joint stiffness [Headache] : headache [Dizziness] : no dizziness [Memory Loss] : no memory loss [Negative] : Heme/Lymph

## 2022-04-06 NOTE — PHYSICAL EXAM
[Normal Outer Ear/Nose] : the outer ears and nose were normal in appearance [No JVD] : no jugular venous distention [Normal Rate] : normal rate  [No Edema] : there was no peripheral edema [Normal] : affect was normal and insight and judgment were intact

## 2022-04-07 ENCOUNTER — APPOINTMENT (OUTPATIENT)
Dept: INTERNAL MEDICINE | Facility: CLINIC | Age: 77
End: 2022-04-07
Payer: MEDICARE

## 2022-04-07 LAB
ALBUMIN SERPL ELPH-MCNC: 4.3 G/DL
ALP BLD-CCNC: 96 U/L
ALT SERPL-CCNC: 12 U/L
ANION GAP SERPL CALC-SCNC: 11 MMOL/L
AST SERPL-CCNC: 21 U/L
BILIRUB SERPL-MCNC: <0.2 MG/DL
BUN SERPL-MCNC: 19 MG/DL
CALCIUM SERPL-MCNC: 9.4 MG/DL
CHLORIDE SERPL-SCNC: 106 MMOL/L
CHOLEST SERPL-MCNC: 197 MG/DL
CK SERPL-CCNC: 76 U/L
CO2 SERPL-SCNC: 24 MMOL/L
CREAT SERPL-MCNC: 0.75 MG/DL
EGFR: 83 ML/MIN/1.73M2
GLUCOSE SERPL-MCNC: 105 MG/DL
HDLC SERPL-MCNC: 61 MG/DL
LDLC SERPL CALC-MCNC: 111 MG/DL
NONHDLC SERPL-MCNC: 136 MG/DL
POTASSIUM SERPL-SCNC: 4.6 MMOL/L
PROT SERPL-MCNC: 6.4 G/DL
SODIUM SERPL-SCNC: 142 MMOL/L
TRIGL SERPL-MCNC: 125 MG/DL

## 2022-05-05 ENCOUNTER — APPOINTMENT (OUTPATIENT)
Dept: NEUROLOGY | Facility: CLINIC | Age: 77
End: 2022-05-05
Payer: MEDICARE

## 2022-05-05 VITALS
SYSTOLIC BLOOD PRESSURE: 114 MMHG | HEIGHT: 65 IN | WEIGHT: 135 LBS | HEART RATE: 85 BPM | DIASTOLIC BLOOD PRESSURE: 69 MMHG | BODY MASS INDEX: 22.49 KG/M2

## 2022-05-05 PROCEDURE — 99214 OFFICE O/P EST MOD 30 MIN: CPT

## 2022-05-05 NOTE — HISTORY OF PRESENT ILLNESS
[FreeTextEntry1] : Mrs. Looney returns for follow-up of her mixed headache syndrome.  As noted on prior visits, she has both tension type headaches and migraine.  She is generally under good control on 50 mg of nortriptyline at bedtime and rizatriptan or Esgic, as needed.  She recently had her fourth COVID injection and had an 8-day migraine.  She states that each vaccination causes her terrible migraine for a week.  She is now back to her baseline.

## 2022-05-05 NOTE — ASSESSMENT
[FreeTextEntry1] : Mrs. Looney returns for follow-up of her mixed headache syndrome.  As noted on prior visits, she has both tension type headaches and migraine.  She is generally under good control on 50 mg of nortriptyline at bedtime and rizatriptan or Esgic, as needed.  She recently had her fourth COVID injection and had an 8-day migraine.  She states that each vaccination causes her terrible migraine for a week.  She is now back to her baseline.\par We again spent a great deal of time discussing her social issues.  Her 's alcohol intake bothers her.  She has a grandchild with Crohn's disease.  She has another grandchild with strabismus.  These occupied a good bit of the time.  The visit took over half an hour.  I have renewed her medications and will see her in follow-up in 6 months.

## 2022-05-19 ENCOUNTER — APPOINTMENT (OUTPATIENT)
Dept: OTOLARYNGOLOGY | Facility: CLINIC | Age: 77
End: 2022-05-19
Payer: MEDICARE

## 2022-05-19 VITALS
SYSTOLIC BLOOD PRESSURE: 114 MMHG | HEART RATE: 96 BPM | BODY MASS INDEX: 21.66 KG/M2 | DIASTOLIC BLOOD PRESSURE: 74 MMHG | OXYGEN SATURATION: 97 % | RESPIRATION RATE: 17 BRPM | WEIGHT: 130 LBS | HEIGHT: 65 IN | TEMPERATURE: 98.4 F

## 2022-05-19 DIAGNOSIS — Z80.7 FAMILY HISTORY OF OTHER MALIGNANT NEOPLASMS OF LYMPHOID, HEMATOPOIETIC AND RELATED TISSUES: ICD-10-CM

## 2022-05-19 DIAGNOSIS — J30.2 OTHER SEASONAL ALLERGIC RHINITIS: ICD-10-CM

## 2022-05-19 PROCEDURE — 99213 OFFICE O/P EST LOW 20 MIN: CPT | Mod: 25

## 2022-05-19 PROCEDURE — 69210 REMOVE IMPACTED EAR WAX UNI: CPT

## 2022-05-19 NOTE — REASON FOR VISIT
[Subsequent Evaluation] : a subsequent evaluation for [FreeTextEntry2] : seasonal allergies, migraine

## 2022-05-19 NOTE — PROCEDURE
[Cerumen Impaction] : Cerumen Impaction [Same] : same as the Pre Op Dx. [] : Removal of Cerumen [FreeTextEntry5] : b cerumen removed atraumatically, TMs intact

## 2022-05-19 NOTE — HISTORY OF PRESENT ILLNESS
[de-identified] : 76 y/o F presenting with seasonal allergies last seen in 10/2020. She has had 4 covid shots second and third migraine 4th 8 d of migraine she has a lot of questions about migraine=- directed to her neurologist. She is here to follow up with seasonal allergies - she had nasal spray and does not know if it is helping she felt a pimple in her ear. She does not feel congested.

## 2022-05-19 NOTE — PHYSICAL EXAM
[Midline] : trachea located in midline position [Normal] : no nystagmus [de-identified] : b cerumen removed atraumatically with alligator forceps, TMs intact  [de-identified] : gait steady

## 2022-05-19 NOTE — ASSESSMENT
[FreeTextEntry1] : 1. b cerumen impaction \par -cerumen removed\par -ears feel better\par -reassured no sign of pimple in ear, explained likely irritation from Q tips\par -Avoid Qtip usage\par 2. migraines - she had a lot of questions - I answered to the best of my ability and rec followup with her neurologist\par 3. AR\par -currently not bothersome\par -rec she give us a call if it does become bothersome or she is symptomatic- she said she would\par RTC as needed

## 2022-06-17 ENCOUNTER — APPOINTMENT (OUTPATIENT)
Dept: OBGYN | Facility: CLINIC | Age: 77
End: 2022-06-17

## 2022-06-17 ENCOUNTER — NON-APPOINTMENT (OUTPATIENT)
Age: 77
End: 2022-06-17

## 2022-06-17 VITALS
WEIGHT: 141 LBS | OXYGEN SATURATION: 96 % | BODY MASS INDEX: 23.46 KG/M2 | SYSTOLIC BLOOD PRESSURE: 127 MMHG | DIASTOLIC BLOOD PRESSURE: 81 MMHG | HEART RATE: 89 BPM

## 2022-06-17 PROCEDURE — 99397 PER PM REEVAL EST PAT 65+ YR: CPT | Mod: GY

## 2022-06-17 PROCEDURE — G0101: CPT

## 2022-06-23 ENCOUNTER — NON-APPOINTMENT (OUTPATIENT)
Age: 77
End: 2022-06-23

## 2022-07-12 NOTE — HISTORY OF PRESENT ILLNESS
[Patient reported PAP Smear was normal] : Patient reported PAP Smear was normal [Currently In Menopause] : currently in menopause [Post-Menopause, No Sxs] : post-menopausal, currently without symptoms [FreeTextEntry1] : RETA ARANGO IS A 77 year OLD WHO PRESENTS FOR AN ANNUAL GYN EXAM.  SHE IS WITHOUT COMPLAINTS.\par REPORTS SHE FEELS BETTER USING THE VAGINAL ESTROGEN AND NEEDS A REFILL.  [PapSmeardate] : 1/14/2020

## 2022-07-12 NOTE — PHYSICAL EXAM
[Appropriately responsive] : appropriately responsive [Alert] : alert [No Acute Distress] : no acute distress [No Lymphadenopathy] : no lymphadenopathy [Regular Rate Rhythm] : regular rate rhythm [No Murmurs] : no murmurs [Clear to Auscultation B/L] : clear to auscultation bilaterally [Soft] : soft [Non-tender] : non-tender [Non-distended] : non-distended [No HSM] : No HSM [No Lesions] : no lesions [No Mass] : no mass [Oriented x3] : oriented x3 [Examination Of The Breasts] : a normal appearance [No Masses] : no breast masses were palpable [Labia Majora] : normal [Labia Minora] : normal [Normal] : normal [Uterine Adnexae] : normal [Atrophy] : atrophy

## 2022-07-20 ENCOUNTER — APPOINTMENT (OUTPATIENT)
Dept: RADIOLOGY | Facility: CLINIC | Age: 77
End: 2022-07-20

## 2022-07-20 ENCOUNTER — RESULT REVIEW (OUTPATIENT)
Age: 77
End: 2022-07-20

## 2022-07-20 ENCOUNTER — OUTPATIENT (OUTPATIENT)
Dept: OUTPATIENT SERVICES | Facility: HOSPITAL | Age: 77
LOS: 1 days | End: 2022-07-20

## 2022-07-20 PROCEDURE — 77085 DXA BONE DENSITY AXL VRT FX: CPT | Mod: 26

## 2022-08-15 ENCOUNTER — RX RENEWAL (OUTPATIENT)
Age: 77
End: 2022-08-15

## 2022-09-12 LAB — CYTOLOGY CVX/VAG DOC THIN PREP: NORMAL

## 2022-10-04 ENCOUNTER — MED ADMIN CHARGE (OUTPATIENT)
Age: 77
End: 2022-10-04

## 2022-10-04 ENCOUNTER — APPOINTMENT (OUTPATIENT)
Dept: INTERNAL MEDICINE | Facility: CLINIC | Age: 77
End: 2022-10-04

## 2022-10-04 PROCEDURE — 90662 IIV NO PRSV INCREASED AG IM: CPT

## 2022-10-04 PROCEDURE — G0008: CPT

## 2022-10-05 ENCOUNTER — APPOINTMENT (OUTPATIENT)
Dept: ORTHOPEDIC SURGERY | Facility: CLINIC | Age: 77
End: 2022-10-05

## 2022-10-05 VITALS — RESPIRATION RATE: 17 BRPM | HEIGHT: 66 IN | BODY MASS INDEX: 22.66 KG/M2 | WEIGHT: 141 LBS

## 2022-10-05 DIAGNOSIS — M18.12 UNILATERAL PRIMARY OSTEOARTHRITIS OF FIRST CARPOMETACARPAL JOINT, LEFT HAND: ICD-10-CM

## 2022-10-05 DIAGNOSIS — M19.041 PRIMARY OSTEOARTHRITIS, RIGHT HAND: ICD-10-CM

## 2022-10-05 PROCEDURE — 99203 OFFICE O/P NEW LOW 30 MIN: CPT | Mod: 25

## 2022-10-05 PROCEDURE — 20600 DRAIN/INJ JOINT/BURSA W/O US: CPT

## 2022-10-05 PROCEDURE — 73130 X-RAY EXAM OF HAND: CPT | Mod: 50

## 2022-10-10 ENCOUNTER — NON-APPOINTMENT (OUTPATIENT)
Age: 77
End: 2022-10-10

## 2022-11-10 ENCOUNTER — APPOINTMENT (OUTPATIENT)
Dept: NEUROLOGY | Facility: CLINIC | Age: 77
End: 2022-11-10

## 2022-11-10 PROCEDURE — 99214 OFFICE O/P EST MOD 30 MIN: CPT

## 2022-11-10 NOTE — HISTORY OF PRESENT ILLNESS
[FreeTextEntry1] : Mrs. 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  Her headaches have been under very good control.  The migraines are less frequent and less intense.  Most of her headaches are stress related.  She continues with the social stressors of what she believes is her 's drinking problem.  She recently had another problem in that she was attacked by a swarm of wasps.  She was taken to the emergency room and treated.  She has also had severe exacerbations of her migraine, following COVID injections.  On this most recent episode, I pretreated her with naproxen and she got through it quite well.  Everything else has remained stable.

## 2022-11-10 NOTE — ASSESSMENT
[FreeTextEntry1] : Mrs. 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  Her headaches have been under very good control.  The migraines are less frequent and less intense.  Most of her headaches are stress related.  She continues with the social stressors of what she believes is her 's drinking problem.  She recently had another problem in that she was attacked by a swarm of wasps.  She was taken to the emergency room and treated.  She has also had severe exacerbations of her migraine, following COVID injections.  On this most recent episode, I pretreated her with naproxen and she got through it quite well.  Everything else has remained stable.\par On examination today, her blood pressure is 104/68.  She walks with the limp that she is always had from her hip dysplasia.  She has entirely normal mental status and cranial nerves.  She remains unchanged.  The visit took well over half an hour, because of all the social issues.  I will renew all of her medications and see her in follow-up in 6 months.  I had asked her to get her medications [place], so that I can track them.  However, she is spending most of her time in [place] and would like to get her medications there.

## 2022-11-14 ENCOUNTER — RX RENEWAL (OUTPATIENT)
Age: 77
End: 2022-11-14

## 2022-11-17 ENCOUNTER — NON-APPOINTMENT (OUTPATIENT)
Age: 77
End: 2022-11-17

## 2022-12-08 ENCOUNTER — OUTPATIENT (OUTPATIENT)
Dept: OUTPATIENT SERVICES | Facility: HOSPITAL | Age: 77
LOS: 1 days | End: 2022-12-08

## 2022-12-08 ENCOUNTER — APPOINTMENT (OUTPATIENT)
Dept: ULTRASOUND IMAGING | Facility: CLINIC | Age: 77
End: 2022-12-08

## 2022-12-08 ENCOUNTER — APPOINTMENT (OUTPATIENT)
Dept: MAMMOGRAPHY | Facility: CLINIC | Age: 77
End: 2022-12-08

## 2022-12-08 PROCEDURE — 77067 SCR MAMMO BI INCL CAD: CPT | Mod: 26

## 2022-12-08 PROCEDURE — 77063 BREAST TOMOSYNTHESIS BI: CPT | Mod: 26

## 2022-12-08 PROCEDURE — 76641 ULTRASOUND BREAST COMPLETE: CPT | Mod: 26,50

## 2022-12-14 ENCOUNTER — APPOINTMENT (OUTPATIENT)
Dept: INTERNAL MEDICINE | Facility: CLINIC | Age: 77
End: 2022-12-14

## 2022-12-14 ENCOUNTER — NON-APPOINTMENT (OUTPATIENT)
Age: 77
End: 2022-12-14

## 2022-12-14 VITALS
WEIGHT: 142 LBS | HEIGHT: 66 IN | DIASTOLIC BLOOD PRESSURE: 78 MMHG | SYSTOLIC BLOOD PRESSURE: 131 MMHG | TEMPERATURE: 97.7 F | BODY MASS INDEX: 22.82 KG/M2

## 2022-12-14 DIAGNOSIS — Z00.00 ENCOUNTER FOR GENERAL ADULT MEDICAL EXAMINATION W/OUT ABNORMAL FINDINGS: ICD-10-CM

## 2022-12-14 DIAGNOSIS — H61.23 IMPACTED CERUMEN, BILATERAL: ICD-10-CM

## 2022-12-14 DIAGNOSIS — G44.209 TENSION-TYPE HEADACHE, UNSPECIFIED, NOT INTRACTABLE: ICD-10-CM

## 2022-12-14 DIAGNOSIS — N95.2 POSTMENOPAUSAL ATROPHIC VAGINITIS: ICD-10-CM

## 2022-12-14 PROCEDURE — 93000 ELECTROCARDIOGRAM COMPLETE: CPT

## 2022-12-14 PROCEDURE — 36415 COLL VENOUS BLD VENIPUNCTURE: CPT

## 2022-12-14 PROCEDURE — G0439: CPT

## 2022-12-14 NOTE — ASSESSMENT
[FreeTextEntry1] : 77 year is here for Medicare annual wellness exam. her cognitive function was normal. Please refer to an appropriate section above for a list of providers that are regularly involved in the patient's care. See above for full details of history and physical. The patient's care team was reviewed and documented above. Listed above are the preventative services for which the patient may be due. I informed the patient with a list and offered assistance in scheduling the appropriate exams.\par Labs sent.

## 2022-12-14 NOTE — HISTORY OF PRESENT ILLNESS
[de-identified] : 76 y/o female presents for Annual Wellness exam.\par UTD on vaccines (COVID and flu).\par UTD with MAMMO/DEXA. DEXA improved and isn't sure what to do about Reclast. Tried calling ENDO but didn't hear back.\par No changes to health.\par \par \par \par

## 2022-12-14 NOTE — HEALTH RISK ASSESSMENT
[Good] : ~his/her~  mood as  good [No falls in past year] : Patient reported no falls in the past year [0] : 2) Feeling down, depressed, or hopeless: Not at all (0) [PHQ-2 Negative - No further assessment needed] : PHQ-2 Negative - No further assessment needed [Patient reported mammogram was normal] : Patient reported mammogram was normal [HIV test declined] : HIV test declined [Hepatitis C test declined] : Hepatitis C test declined [None] : None [With Family] : lives with family [Retired] : retired [] :  [[id number]] : 0 [Change in mental status noted] : No change in mental status noted [Language] : denies difficulty with language [Behavior] : denies difficulty with behavior [Learning/Retaining New Information] : denies difficulty learning/retaining new information [Handling Complex Tasks] : denies difficulty handling complex tasks [Reasoning] : denies difficulty with reasoning [Spatial Ability and Orientation] : denies difficulty with spatial ability and orientation [Sexually Active] : sexually active [Feels Safe at Home] : Feels safe at home [Fully functional (bathing, dressing, toileting, transferring, walking, feeding)] : Fully functional (bathing, dressing, toileting, transferring, walking, feeding) [Fully functional (using the telephone, shopping, preparing meals, housekeeping, doing laundry, using] : Fully functional and needs no help or supervision to perform IADLs (using the telephone, shopping, preparing meals, housekeeping, doing laundry, using transportation, managing medications and managing finances) [Reports changes in hearing] : Reports no changes in hearing [Reports changes in vision] : Reports no changes in vision [Reports changes in dental health] : Reports no changes in dental health [Smoke Detector] : smoke detector [Safety elements used in home] : safety elements used in home [Seat Belt] :  uses seat belt [MammogramDate] : 12/22 [BoneDensityDate] : 07/22

## 2022-12-15 LAB
25(OH)D3 SERPL-MCNC: 49.6 NG/ML
ALBUMIN SERPL ELPH-MCNC: 4.7 G/DL
ALP BLD-CCNC: 99 U/L
ALT SERPL-CCNC: 12 U/L
ANION GAP SERPL CALC-SCNC: 11 MMOL/L
AST SERPL-CCNC: 22 U/L
BASOPHILS # BLD AUTO: 0.06 K/UL
BASOPHILS NFR BLD AUTO: 0.8 %
BILIRUB SERPL-MCNC: <0.2 MG/DL
BUN SERPL-MCNC: 17 MG/DL
CALCIUM SERPL-MCNC: 10.4 MG/DL
CHLORIDE SERPL-SCNC: 101 MMOL/L
CHOLEST SERPL-MCNC: 222 MG/DL
CO2 SERPL-SCNC: 27 MMOL/L
CREAT SERPL-MCNC: 0.83 MG/DL
EGFR: 73 ML/MIN/1.73M2
EOSINOPHIL # BLD AUTO: 0.09 K/UL
EOSINOPHIL NFR BLD AUTO: 1.2 %
ESTIMATED AVERAGE GLUCOSE: 117 MG/DL
GLUCOSE SERPL-MCNC: 90 MG/DL
HBA1C MFR BLD HPLC: 5.7 %
HCT VFR BLD CALC: 42.5 %
HDLC SERPL-MCNC: 69 MG/DL
HGB BLD-MCNC: 13.2 G/DL
IMM GRANULOCYTES NFR BLD AUTO: 0.3 %
LDLC SERPL CALC-MCNC: 125 MG/DL
LYMPHOCYTES # BLD AUTO: 3.2 K/UL
LYMPHOCYTES NFR BLD AUTO: 41.7 %
MAN DIFF?: NORMAL
MCHC RBC-ENTMCNC: 31.1 GM/DL
MCHC RBC-ENTMCNC: 31.4 PG
MCV RBC AUTO: 101.2 FL
MONOCYTES # BLD AUTO: 0.55 K/UL
MONOCYTES NFR BLD AUTO: 7.2 %
NEUTROPHILS # BLD AUTO: 3.75 K/UL
NEUTROPHILS NFR BLD AUTO: 48.8 %
NONHDLC SERPL-MCNC: 153 MG/DL
PLATELET # BLD AUTO: 302 K/UL
POTASSIUM SERPL-SCNC: 4.9 MMOL/L
PROT SERPL-MCNC: 6.9 G/DL
RBC # BLD: 4.2 M/UL
RBC # FLD: 13.5 %
SODIUM SERPL-SCNC: 139 MMOL/L
T3 SERPL-MCNC: 118 NG/DL
T4 FREE SERPL-MCNC: 1 NG/DL
TRIGL SERPL-MCNC: 143 MG/DL
TSH SERPL-ACNC: 3.84 UIU/ML
VIT B12 SERPL-MCNC: 1112 PG/ML
WBC # FLD AUTO: 7.67 K/UL

## 2023-01-11 ENCOUNTER — RX RENEWAL (OUTPATIENT)
Age: 78
End: 2023-01-11

## 2023-02-14 ENCOUNTER — APPOINTMENT (OUTPATIENT)
Dept: ENDOCRINOLOGY | Facility: CLINIC | Age: 78
End: 2023-02-14
Payer: MEDICARE

## 2023-02-14 VITALS — BODY MASS INDEX: 23.11 KG/M2 | HEIGHT: 65.5 IN

## 2023-02-14 VITALS
DIASTOLIC BLOOD PRESSURE: 74 MMHG | WEIGHT: 141 LBS | SYSTOLIC BLOOD PRESSURE: 136 MMHG | HEART RATE: 91 BPM | BODY MASS INDEX: 22.76 KG/M2

## 2023-02-14 PROCEDURE — 99213 OFFICE O/P EST LOW 20 MIN: CPT

## 2023-02-14 RX ORDER — RIZATRIPTAN BENZOATE 10 MG/1
10 TABLET ORAL
Qty: 8 | Refills: 2 | Status: DISCONTINUED | COMMUNITY
Start: 2018-06-06 | End: 2023-02-14

## 2023-02-14 RX ORDER — SUMATRIPTAN SUCCINATE 100 MG/1
TABLET, FILM COATED ORAL
Refills: 0 | Status: DISCONTINUED | COMMUNITY
End: 2023-02-14

## 2023-02-14 RX ORDER — NAPROXEN SODIUM 550 MG/1
550 TABLET ORAL
Qty: 60 | Refills: 1 | Status: DISCONTINUED | COMMUNITY
Start: 2022-05-05 | End: 2023-02-14

## 2023-02-14 RX ORDER — BUTALBITAL, ACETAMINOPHEN AND CAFFEINE 50; 325; 40 MG/1; MG/1; MG/1
50-325-40 TABLET ORAL EVERY 4 HOURS
Qty: 180 | Refills: 0 | Status: DISCONTINUED | COMMUNITY
Start: 2021-11-11 | End: 2023-02-14

## 2023-02-14 NOTE — ASSESSMENT
[FreeTextEntry1] : Osteoporosis. s/p zoledronic acid infusion x 1\par Usual course is 3 infusions, once per year for 3 years.\par since her bone density has improved significantly, I think we can do drug holiday after 2 doses.  She is agreeable to second dose.\par will do labs today and then send order to infusion center.\par continue regular exercise, calcium and vitamin D supplementation\par next bone density after July 2024\par RTO 1 -2 years\par

## 2023-02-14 NOTE — DATA REVIEWED
[FreeTextEntry1] : 12/22  tot chol 222, trig 143, HDL 69, , 25D 49.6, TSH 3.84\par 10/21  BSAP 13.1, 25D 48.3, TSH 3.55\par 9/20: TSH 4.59,  tot chol 204, trig 67, HDL 71, , 25D 51.9, A1c 5.7%\par \par bone density, 7/22\par spine T -2.3, prev -2.1 (2020)\par tot hip T -2.0, prev -2.4 (2020)\par fem neck T-1.9, prev  -2.4  (2020)\par

## 2023-02-14 NOTE — HISTORY OF PRESENT ILLNESS
[FreeTextEntry1] : Last visit 10/2020\par She had zoledronic acid infusion 12/20 with some weakness and achiness following but it wasn't terrible\par She walks a lot, uses weights (3-10 lb) and does yoga\par no falls or fractures since last visit\par She had severe migraine following second Covid vaccine dose, that lasted 2 weeks\par She has OA in her hands.  Steroid injection was painful and reduced pain only for a few days\par no height loss.\par bone density reviewed: improved T score in hip, slightly lower in spine.\par \par PMH: migraine HA\par osteoporosis, s/p zoledronic acid 12/20.  remote history alendronate\par \par Meds:\par atorvastatin 20mg\par Vagifem\par Nasonex\par nortriptyline, Maxalt prn\par Xanax 0.5mg prn\par supplements:  Ca/D, glucosamine, Mvi, vit C, Vit E, Zinc

## 2023-02-14 NOTE — PHYSICAL EXAM
[Alert] : alert [No Acute Distress] : no acute distress [No Proptosis] : no proptosis [No Lid Lag] : no lid lag [Normal Hearing] : hearing was normal [No LAD] : no lymphadenopathy [Thyroid Not Enlarged] : the thyroid was not enlarged [Clear to Auscultation] : lungs were clear to auscultation bilaterally [Normal S1, S2] : normal S1 and S2 [Regular Rhythm] : with a regular rhythm [Kyphosis] : no kyphosis present [Scoliosis] : no scoliosis [Acanthosis Nigricans] : no acanthosis nigricans [Normal Affect] : the affect was normal [Normal Mood] : the mood was normal

## 2023-02-16 LAB
ANION GAP SERPL CALC-SCNC: 11 MMOL/L
BUN SERPL-MCNC: 20 MG/DL
CALCIUM SERPL-MCNC: 10.1 MG/DL
CALCIUM SERPL-MCNC: 10.1 MG/DL
CHLORIDE SERPL-SCNC: 102 MMOL/L
CO2 SERPL-SCNC: 27 MMOL/L
CREAT SERPL-MCNC: 0.79 MG/DL
EGFR: 77 ML/MIN/1.73M2
GLUCOSE SERPL-MCNC: 100 MG/DL
PARATHYROID HORMONE INTACT: 35 PG/ML
POTASSIUM SERPL-SCNC: 4.8 MMOL/L
SODIUM SERPL-SCNC: 140 MMOL/L

## 2023-02-17 LAB — ALP BONE SERPL-MCNC: 14.3 UG/L

## 2023-02-28 ENCOUNTER — APPOINTMENT (OUTPATIENT)
Dept: INFUSION THERAPY | Facility: CLINIC | Age: 78
End: 2023-02-28

## 2023-02-28 ENCOUNTER — OUTPATIENT (OUTPATIENT)
Dept: OUTPATIENT SERVICES | Facility: HOSPITAL | Age: 78
LOS: 1 days | End: 2023-02-28
Payer: MEDICARE

## 2023-02-28 VITALS
WEIGHT: 139.99 LBS | SYSTOLIC BLOOD PRESSURE: 132 MMHG | DIASTOLIC BLOOD PRESSURE: 84 MMHG | HEART RATE: 82 BPM | HEIGHT: 65 IN | RESPIRATION RATE: 16 BRPM | OXYGEN SATURATION: 97 % | TEMPERATURE: 98 F

## 2023-02-28 DIAGNOSIS — M81.0 AGE-RELATED OSTEOPOROSIS WITHOUT CURRENT PATHOLOGICAL FRACTURE: ICD-10-CM

## 2023-02-28 PROCEDURE — 96365 THER/PROPH/DIAG IV INF INIT: CPT

## 2023-02-28 RX ORDER — ZOLEDRONIC ACID 5 MG/100ML
5 INJECTION, SOLUTION INTRAVENOUS ONCE
Refills: 0 | Status: COMPLETED | OUTPATIENT
Start: 2023-02-28 | End: 2023-02-28

## 2023-02-28 RX ADMIN — ZOLEDRONIC ACID 400 MILLIGRAM(S): 5 INJECTION, SOLUTION INTRAVENOUS at 14:32

## 2023-02-28 RX ADMIN — ZOLEDRONIC ACID 5 MILLIGRAM(S): 5 INJECTION, SOLUTION INTRAVENOUS at 15:00

## 2023-03-02 ENCOUNTER — NON-APPOINTMENT (OUTPATIENT)
Age: 78
End: 2023-03-02

## 2023-05-02 ENCOUNTER — APPOINTMENT (OUTPATIENT)
Dept: OTOLARYNGOLOGY | Facility: CLINIC | Age: 78
End: 2023-05-02
Payer: MEDICARE

## 2023-05-02 DIAGNOSIS — R09.81 NASAL CONGESTION: ICD-10-CM

## 2023-05-02 PROCEDURE — 31231 NASAL ENDOSCOPY DX: CPT

## 2023-05-02 PROCEDURE — 99214 OFFICE O/P EST MOD 30 MIN: CPT | Mod: 25

## 2023-05-02 NOTE — HISTORY OF PRESENT ILLNESS
[de-identified] : CC: ETD\par \par HISTORY OF PRESENT ILLNESS: Ms. Looney is a pleasant 77 year old lady with AD ETD\par previously seen by Dr. Brownlee and Dr. Jaramillo\par followed by Dr. Kanner from Neurology for migraines\par she was on a flight a month ago and she has persistent pressure in the right ear. denies significnat hearing loss drainage or vertigo\par she did have a bout of vertigo 10 years ago and had an MRI that was clear\par saw Dr. Jaramillo and had various testing that did not identify a peripheral source per patient\par denies significant nasal congestion obstruction or rhinorrhea\par did have some mild allergies recently\par \par REVIEW OF SYSTEMS: \par General ROS: negative for - chills, fatigue or fever\par Psychological ROS: negative for - anxiety or depression\par Ophthalmic ROS: negative for - blurry vision, decreased vision or double vision \par ENT ROS: negative except as noted from HPI\par Allergy and Immunology ROS: negative except as noted from HPI\par Hematological and Lymphatic ROS: negative for - bleeding problems \par Endocrine ROS: negative for - malaise/lethargy\par Respiratory ROS: negative for - stridor\par Cardiovascular ROS: negative for - chest pain\par Gastrointestinal ROS: negative for - appetite loss or nausea/vomiting\par Genitourinary ROS: negative for - incontinence\par Musculoskeletal ROS: negative for - gait disturbance \par Neurological ROS: negative for - behavioral changes\par Dermatological ROS: negative for - nail changes\par \par Physical Exam:\par \par GENERAL APPEARANCE: Well-developed and No Acute Distress.\par COMMUNICATION: Able to Communicate. Strong Voice.\par \par HEAD AND FACE\par Eyes: Testing of ocular motility including primary gaze alignment normal.\par Inspection and Appearance: No evidence of lesions or masses\par Palpation: Palpation of the face reveals no sinus tenderness\par Salivary Glands: Symmetric without masses\par Facial Strength: Symmetric without evidence of facial paralysis\par \par EAR, NOSE, MOUTH, and THROAT:\par Ear Canals and Tympanic Membranes, Bilateral: No evidence of inflammation or lesions.\par Thresholds: Clinical speech reception thresholds normal.\par External, Nose and Auricle: No lesions or masses.\par Nasal, Mucosa, Septum, and Turbinates: See endoscopy.\par \par NECK:\par Evaluation: No evidence of masses or crepitus. The neck is symmetric and the trachea is in the midline position.\par Thyroid: No evidence of enlargement, tenderness or mass.\par Neck Lymph Nodes: WNL.\par Respiratory: Inspection of the chest including symmetry, expansion and/or assessment of respiratory effort normal.\par Cardiovascular: Evaluation of peripheral vascular system by observation and palpation of capillary refill, normal.\par Neurological/Psychiatric: Alert, Oriented, Mood, and Affect Normal.\par \par IMAGING: \par  \par PROCEDURE: Nasal endoscopy (02318)\par  \par SURGEON: Obinna Castillo MD\par  \par Prior to the procedure, I had a discussion with the patient regarding the risks, benefits, and alternatives of the procedure and a verbal consent was obtained.\par  \par After obtaining adequate decongestion of the nasal mucosa with topical Epinephrine and adequate anesthesia with topical Lidocaine nasal spray, the nasal endoscope was used to examine the nasal passages and paranasal sinuses. The endoscope was passed along the floor of the nose bilaterally to evaluate the inferior meatus, floor of the nose, inferior turbinate, and nasopharynx. The scope was then passed superiorly to evaluate the area of the sphenoethmoidal recess, superior turbinate and superior meatus. As the scope was withdrawn anteriorly, the middle turbinate and middle meatus were carefully inspected. The endoscope was withdrawn and the patient tolerated the procedure well. No complications were encountered.\par  \par INSTRUMENTS: digital flex\par  \par EXAM FINDINGS: significant right spur contacting the lateral nasal wall. mild BITH with clear secretions. nasopharynx is clear. bilateral eustachian tube orifices are patent\par \par IMPRESSION: Ms. Looney is a pleasant 77 year old lady with AD ETD, DNS, migraines\par \par PLAN:\par -her nasal endoscopy and MRI confirm that she does have a significant deviated nasal septum with a specific contact point. this could contribute to her migraines although it is well controlled\par -she has an option of septoplasty, BITR\par -tympanometry identified type C tymp on the right\par -I advised her to try afrin for 3 days, with autoinsufflation techniques\par -she will call me if her symptoms persist past 10 days\par \par \par MD CHANDANA Mata\par Rhinology and Skull Base Surgery\par Department of Otolaryngology- Head and Neck Surgery\par [facility]\par [facility]\par

## 2023-05-11 ENCOUNTER — APPOINTMENT (OUTPATIENT)
Dept: NEUROLOGY | Facility: CLINIC | Age: 78
End: 2023-05-11
Payer: MEDICARE

## 2023-05-11 VITALS
HEIGHT: 65.5 IN | WEIGHT: 132 LBS | BODY MASS INDEX: 21.73 KG/M2 | HEART RATE: 88 BPM | SYSTOLIC BLOOD PRESSURE: 126 MMHG | DIASTOLIC BLOOD PRESSURE: 77 MMHG

## 2023-05-11 DIAGNOSIS — J34.2 DEVIATED NASAL SEPTUM: ICD-10-CM

## 2023-05-11 PROCEDURE — 99214 OFFICE O/P EST MOD 30 MIN: CPT

## 2023-05-11 NOTE — ASSESSMENT
[FreeTextEntry1] : Mrs. 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  There was a new symptom since last visit.  She apparently had some intense right ear pain and was told that she had a deviated septum.  The ENT physician suggested that the deviated septum might be the cause of her migraine and that she should get it fixed.  We spent the rest of the visit discussing her varied social issues.  These include a grandson with Crohn's disease (just admitted to [place]) and her perception of her 's excessive drinking.  Her headaches have been under excellent control and there has been no change in her neurological status.  Because of the social issues, the visit took approximately half an hour.  I will renew her medications and see her in follow-up in 6 months.

## 2023-05-11 NOTE — HISTORY OF PRESENT ILLNESS
[FreeTextEntry1] : Mrs. 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  There was a new symptom since last visit.  She apparently had some intense right ear pain and was told that she had a deviated septum.  The ENT physician suggested that the deviated septum might be the cause of her migraine and that she should get it fixed.  We spent the rest of the visit discussing her varied social issues.  These include a grandson with Crohn's disease (just admitted to [place]) and her perception of her 's excessive drinking.  Her headaches have been under excellent control and there has been no change in her neurological status.  Because of the social issues, the visit took approximately half an hour.  I will renew her medications and see her in follow-up in 6 months.

## 2023-06-29 ENCOUNTER — NON-APPOINTMENT (OUTPATIENT)
Age: 78
End: 2023-06-29

## 2023-07-17 ENCOUNTER — APPOINTMENT (OUTPATIENT)
Dept: INTERNAL MEDICINE | Facility: CLINIC | Age: 78
End: 2023-07-17
Payer: MEDICARE

## 2023-07-17 VITALS
TEMPERATURE: 98.2 F | BODY MASS INDEX: 23.21 KG/M2 | SYSTOLIC BLOOD PRESSURE: 111 MMHG | HEART RATE: 90 BPM | HEIGHT: 65.5 IN | WEIGHT: 141 LBS | DIASTOLIC BLOOD PRESSURE: 64 MMHG | OXYGEN SATURATION: 97 %

## 2023-07-17 DIAGNOSIS — R79.89 OTHER SPECIFIED ABNORMAL FINDINGS OF BLOOD CHEMISTRY: ICD-10-CM

## 2023-07-17 DIAGNOSIS — Z80.8 FAMILY HISTORY OF MALIGNANT NEOPLASM OF OTHER ORGANS OR SYSTEMS: ICD-10-CM

## 2023-07-17 PROCEDURE — 99214 OFFICE O/P EST MOD 30 MIN: CPT | Mod: 25

## 2023-07-17 PROCEDURE — 36415 COLL VENOUS BLD VENIPUNCTURE: CPT

## 2023-07-17 RX ORDER — BUTALBITAL, ACETAMINOPHEN AND CAFFEINE 325; 50; 40 MG/1; MG/1; MG/1
50-325-40 TABLET ORAL 3 TIMES DAILY
Qty: 90 | Refills: 1 | Status: COMPLETED | COMMUNITY
Start: 2022-05-05 | End: 2023-07-17

## 2023-07-17 NOTE — HEALTH RISK ASSESSMENT
[0] : 2) Feeling down, depressed, or hopeless: Not at all (0) [PHQ-2 Negative - No further assessment needed] : PHQ-2 Negative - No further assessment needed [[id number]] : 0 [Never] : Never

## 2023-07-17 NOTE — HISTORY OF PRESENT ILLNESS
[de-identified] : Here for f/u.\par Barb  suudenly of brain cancer last spring. \par Went to [place] with family.\par No phyical complaints. \par Had Reclast. F/u DEXA summer 2024.

## 2023-07-17 NOTE — ASSESSMENT
[FreeTextEntry1] : Doing well.\par Support and encourmangment provided regarding grief over her brother.\par Labs sent.\par

## 2023-07-18 ENCOUNTER — NON-APPOINTMENT (OUTPATIENT)
Age: 78
End: 2023-07-18

## 2023-07-18 LAB
ALBUMIN SERPL ELPH-MCNC: 4.7 G/DL
ALP BLD-CCNC: 89 U/L
ALT SERPL-CCNC: 15 U/L
ANION GAP SERPL CALC-SCNC: 11 MMOL/L
AST SERPL-CCNC: 26 U/L
BILIRUB SERPL-MCNC: 0.2 MG/DL
BUN SERPL-MCNC: 16 MG/DL
CALCIUM SERPL-MCNC: 10 MG/DL
CHLORIDE SERPL-SCNC: 103 MMOL/L
CHOLEST SERPL-MCNC: 217 MG/DL
CO2 SERPL-SCNC: 28 MMOL/L
CREAT SERPL-MCNC: 0.93 MG/DL
EGFR: 63 ML/MIN/1.73M2
GLUCOSE SERPL-MCNC: 101 MG/DL
HDLC SERPL-MCNC: 61 MG/DL
LDLC SERPL CALC-MCNC: 129 MG/DL
NONHDLC SERPL-MCNC: 156 MG/DL
POTASSIUM SERPL-SCNC: 5.1 MMOL/L
PROT SERPL-MCNC: 6.8 G/DL
SODIUM SERPL-SCNC: 142 MMOL/L
TRIGL SERPL-MCNC: 153 MG/DL
TSH SERPL-ACNC: 2.39 UIU/ML

## 2023-08-16 ENCOUNTER — APPOINTMENT (OUTPATIENT)
Dept: OTOLARYNGOLOGY | Facility: CLINIC | Age: 78
End: 2023-08-16
Payer: MEDICARE

## 2023-08-16 ENCOUNTER — APPOINTMENT (OUTPATIENT)
Age: 78
End: 2023-08-16
Payer: MEDICARE

## 2023-08-16 DIAGNOSIS — L29.9 PRURITUS, UNSPECIFIED: ICD-10-CM

## 2023-08-16 DIAGNOSIS — H69.80 OTHER SPECIFIED DISORDERS OF EUSTACHIAN TUBE, UNSPECIFIED EAR: ICD-10-CM

## 2023-08-16 PROCEDURE — 69210 REMOVE IMPACTED EAR WAX UNI: CPT

## 2023-08-16 PROCEDURE — 92567 TYMPANOMETRY: CPT

## 2023-08-16 PROCEDURE — 99214 OFFICE O/P EST MOD 30 MIN: CPT | Mod: 25
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
## 2023-08-17 VITALS
WEIGHT: 135 LBS | OXYGEN SATURATION: 97 % | BODY MASS INDEX: 22.22 KG/M2 | HEART RATE: 88 BPM | DIASTOLIC BLOOD PRESSURE: 79 MMHG | SYSTOLIC BLOOD PRESSURE: 138 MMHG | TEMPERATURE: 97.5 F | HEIGHT: 65.5 IN

## 2023-08-22 ENCOUNTER — TRANSCRIPTION ENCOUNTER (OUTPATIENT)
Age: 78
End: 2023-08-22

## 2023-08-22 ENCOUNTER — NON-APPOINTMENT (OUTPATIENT)
Age: 78
End: 2023-08-22

## 2023-10-08 ENCOUNTER — NON-APPOINTMENT (OUTPATIENT)
Age: 78
End: 2023-10-08

## 2023-10-25 ENCOUNTER — APPOINTMENT (OUTPATIENT)
Dept: NEUROLOGY | Facility: CLINIC | Age: 78
End: 2023-10-25
Payer: MEDICARE

## 2023-10-25 VITALS
WEIGHT: 135 LBS | HEART RATE: 97 BPM | HEIGHT: 65 IN | DIASTOLIC BLOOD PRESSURE: 80 MMHG | SYSTOLIC BLOOD PRESSURE: 151 MMHG | BODY MASS INDEX: 22.49 KG/M2

## 2023-10-25 VITALS — SYSTOLIC BLOOD PRESSURE: 130 MMHG | DIASTOLIC BLOOD PRESSURE: 70 MMHG

## 2023-10-25 DIAGNOSIS — Z65.9 PROBLEM RELATED TO UNSPECIFIED PSYCHOSOCIAL CIRCUMSTANCES: ICD-10-CM

## 2023-10-25 PROCEDURE — 99213 OFFICE O/P EST LOW 20 MIN: CPT

## 2023-11-16 ENCOUNTER — RX RENEWAL (OUTPATIENT)
Age: 78
End: 2023-11-16

## 2023-12-12 ENCOUNTER — APPOINTMENT (OUTPATIENT)
Dept: MAMMOGRAPHY | Facility: CLINIC | Age: 78
End: 2023-12-12
Payer: MEDICARE

## 2023-12-12 ENCOUNTER — APPOINTMENT (OUTPATIENT)
Dept: ULTRASOUND IMAGING | Facility: CLINIC | Age: 78
End: 2023-12-12
Payer: MEDICARE

## 2023-12-12 PROCEDURE — 77067 SCR MAMMO BI INCL CAD: CPT | Mod: 26

## 2023-12-12 PROCEDURE — 76641 ULTRASOUND BREAST COMPLETE: CPT | Mod: 26,50

## 2023-12-12 PROCEDURE — 77063 BREAST TOMOSYNTHESIS BI: CPT | Mod: 26

## 2023-12-29 ENCOUNTER — NON-APPOINTMENT (OUTPATIENT)
Age: 78
End: 2023-12-29

## 2023-12-29 RX ORDER — BUTALBITAL, ACETAMINOPHEN AND CAFFEINE 325; 50; 40 MG/1; MG/1; MG/1
50-325-40 TABLET ORAL
Qty: 90 | Refills: 0 | Status: ACTIVE | COMMUNITY
Start: 2022-11-10 | End: 1900-01-01

## 2024-01-24 ENCOUNTER — NON-APPOINTMENT (OUTPATIENT)
Age: 79
End: 2024-01-24

## 2024-01-24 RX ORDER — NAPROXEN SODIUM 550 MG/1
550 TABLET ORAL
Qty: 20 | Refills: 1 | Status: ACTIVE | COMMUNITY
Start: 2022-10-10 | End: 1900-01-01

## 2024-02-26 ENCOUNTER — NON-APPOINTMENT (OUTPATIENT)
Age: 79
End: 2024-02-26

## 2024-03-05 ENCOUNTER — APPOINTMENT (OUTPATIENT)
Dept: INTERNAL MEDICINE | Facility: CLINIC | Age: 79
End: 2024-03-05
Payer: MEDICARE

## 2024-03-05 VITALS
HEART RATE: 89 BPM | DIASTOLIC BLOOD PRESSURE: 85 MMHG | TEMPERATURE: 98.2 F | WEIGHT: 147 LBS | HEIGHT: 65 IN | SYSTOLIC BLOOD PRESSURE: 135 MMHG | BODY MASS INDEX: 24.49 KG/M2

## 2024-03-05 DIAGNOSIS — M81.0 AGE-RELATED OSTEOPOROSIS W/OUT CURRENT PATHOLOGICAL FRACTURE: ICD-10-CM

## 2024-03-05 DIAGNOSIS — H69.91 UNSPECIFIED EUSTACHIAN TUBE DISORDER, RIGHT EAR: ICD-10-CM

## 2024-03-05 DIAGNOSIS — Z91.89 AGE-RELATED OSTEOPOROSIS W/OUT CURRENT PATHOLOGICAL FRACTURE: ICD-10-CM

## 2024-03-05 DIAGNOSIS — E78.00 PURE HYPERCHOLESTEROLEMIA, UNSPECIFIED: ICD-10-CM

## 2024-03-05 DIAGNOSIS — H61.21 IMPACTED CERUMEN, RIGHT EAR: ICD-10-CM

## 2024-03-05 PROCEDURE — G2211 COMPLEX E/M VISIT ADD ON: CPT

## 2024-03-05 PROCEDURE — 36415 COLL VENOUS BLD VENIPUNCTURE: CPT

## 2024-03-05 PROCEDURE — 99214 OFFICE O/P EST MOD 30 MIN: CPT

## 2024-03-05 RX ORDER — ATORVASTATIN CALCIUM 10 MG/1
10 TABLET, FILM COATED ORAL
Qty: 90 | Refills: 1 | Status: ACTIVE | COMMUNITY
Start: 2017-04-03 | End: 1900-01-01

## 2024-03-05 RX ORDER — ATORVASTATIN CALCIUM 20 MG/1
20 TABLET, FILM COATED ORAL
Qty: 45 | Refills: 1 | Status: ACTIVE | COMMUNITY
Start: 2017-04-03 | End: 1900-01-01

## 2024-03-05 NOTE — PHYSICAL EXAM
[Normal Outer Ear/Nose] : the outer ears and nose were normal in appearance [Normal TMs] : both tympanic membranes were normal [Normal Oropharynx] : the oropharynx was normal [No Respiratory Distress] : no respiratory distress  [No Accessory Muscle Use] : no accessory muscle use [Normal Rate] : normal rate  [No Edema] : there was no peripheral edema [Normal] : affect was normal and insight and judgment were intact [de-identified] : +deviated septum

## 2024-03-05 NOTE — HEALTH RISK ASSESSMENT
[Patient reported mammogram was normal] : Patient reported mammogram was normal [Patient reported PAP Smear was normal] : Patient reported PAP Smear was normal [Good] : ~his/her~ current health as good [No falls in past year] : Patient reported no falls in the past year [0] : 2) Feeling down, depressed, or hopeless: Not at all (0) [[id number]] : 0 [PHQ-2 Negative - No further assessment needed] : PHQ-2 Negative - No further assessment needed [Change in mental status noted] : No change in mental status noted [Language] : denies difficulty with language [Behavior] : denies difficulty with behavior [Learning/Retaining New Information] : denies difficulty learning/retaining new information [Handling Complex Tasks] : denies difficulty handling complex tasks [Reasoning] : denies difficulty with reasoning [None] : None [Spatial Ability and Orientation] : denies difficulty with spatial ability and orientation [With Family] : lives with family [] :  [Feels Safe at Home] : Feels safe at home [Fully functional (bathing, dressing, toileting, transferring, walking, feeding)] : Fully functional (bathing, dressing, toileting, transferring, walking, feeding) [Fully functional (using the telephone, shopping, preparing meals, housekeeping, doing laundry, using] : Fully functional and needs no help or supervision to perform IADLs (using the telephone, shopping, preparing meals, housekeeping, doing laundry, using transportation, managing medications and managing finances) [Reports changes in hearing] : Reports no changes in hearing [Reports changes in vision] : Reports no changes in vision [MammogramDate] : 12/23 [PapSmearDate] : 01/24 [Never] : Never

## 2024-03-05 NOTE — REVIEW OF SYSTEMS
[Negative] : Heme/Lymph [Earache] : earache [Postnasal Drip] : no postnasal drip [FreeTextEntry4] : marcial

## 2024-03-05 NOTE — ASSESSMENT
[FreeTextEntry1] : Statin refilled (alternates dose). Labs sent, Pt with Eustacian tube dysfxn due to ?deviated septum.  Can use Flonase daily x 2 weeks and see if that helps. DEXA in 7/2024
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
## 2024-03-06 LAB
ALBUMIN SERPL ELPH-MCNC: 4.5 G/DL
ALP BLD-CCNC: 98 U/L
ALT SERPL-CCNC: 14 U/L
ANION GAP SERPL CALC-SCNC: 14 MMOL/L
AST SERPL-CCNC: 22 U/L
BILIRUB SERPL-MCNC: <0.2 MG/DL
BUN SERPL-MCNC: 23 MG/DL
CALCIUM SERPL-MCNC: 9.3 MG/DL
CHLORIDE SERPL-SCNC: 104 MMOL/L
CHOLEST SERPL-MCNC: 207 MG/DL
CO2 SERPL-SCNC: 23 MMOL/L
CREAT SERPL-MCNC: 0.92 MG/DL
EGFR: 64 ML/MIN/1.73M2
GLUCOSE SERPL-MCNC: 87 MG/DL
HDLC SERPL-MCNC: 57 MG/DL
LDLC SERPL CALC-MCNC: 110 MG/DL
NONHDLC SERPL-MCNC: 150 MG/DL
POTASSIUM SERPL-SCNC: 4.5 MMOL/L
PROT SERPL-MCNC: 6.8 G/DL
SODIUM SERPL-SCNC: 141 MMOL/L
TRIGL SERPL-MCNC: 232 MG/DL

## 2024-03-26 RX ORDER — ESTRADIOL 10 UG/1
10 TABLET, FILM COATED VAGINAL
Qty: 24 | Refills: 0 | Status: ACTIVE | COMMUNITY
Start: 2020-01-14 | End: 1900-01-01

## 2024-04-09 ENCOUNTER — RX RENEWAL (OUTPATIENT)
Age: 79
End: 2024-04-09

## 2024-04-24 ENCOUNTER — APPOINTMENT (OUTPATIENT)
Dept: NEUROLOGY | Facility: CLINIC | Age: 79
End: 2024-04-24
Payer: MEDICARE

## 2024-04-24 VITALS
SYSTOLIC BLOOD PRESSURE: 133 MMHG | DIASTOLIC BLOOD PRESSURE: 76 MMHG | HEART RATE: 90 BPM | WEIGHT: 147 LBS | HEIGHT: 65 IN | BODY MASS INDEX: 24.49 KG/M2

## 2024-04-24 DIAGNOSIS — G43.909 MIGRAINE, UNSPECIFIED, NOT INTRACTABLE, W/OUT STATUS MIGRAINOSUS: ICD-10-CM

## 2024-04-24 PROCEDURE — 99213 OFFICE O/P EST LOW 20 MIN: CPT

## 2024-04-24 NOTE — ASSESSMENT
[FreeTextEntry1] : Mrs. 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  As noted on prior visits, she has both tension headaches and migraine headaches.  The tension headaches have been under good control on nortriptyline.  Her migraine headaches usually awaken her in the morning and are treated with rizatriptan.  She can have a recurrence 24 hours later and they can last up to 3 days.  She is reasonably satisfied with her current control. A good part of today's visit was again spent on social issues.  Her grandson is about to go to [place] and she is very concerned about his Crohn's disease.  Everything at home seems to be stable.  She states that she lost some of her Esgic And I have warned her about being careful with her controlled medications.  She has enough medication for the time being.  The visit took approximately 25 minutes.  I will see her in follow-up in 6 months.  I have also told her that I am retiring and I will be transferring her care to a doctor in [place].

## 2024-04-24 NOTE — HISTORY OF PRESENT ILLNESS
[FreeTextEntry1] : Mrs. 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  As noted on prior visits, she has both tension headaches and migraine headaches.  The tension headaches have been under good control on nortriptyline.  Her migraine headaches usually awaken her in the morning and are treated with rizatriptan.  She can have a recurrence 24 hours later and they can last up to 3 days.  She is reasonably satisfied with her current control.

## 2024-04-27 ENCOUNTER — NON-APPOINTMENT (OUTPATIENT)
Age: 79
End: 2024-04-27

## 2024-04-27 RX ORDER — RIZATRIPTAN BENZOATE 10 MG/1
10 TABLET ORAL
Qty: 30 | Refills: 0 | Status: ACTIVE | COMMUNITY
Start: 2022-05-05 | End: 1900-01-01

## 2024-04-27 RX ORDER — NORTRIPTYLINE HYDROCHLORIDE 25 MG/1
25 CAPSULE ORAL
Qty: 180 | Refills: 2 | Status: ACTIVE | COMMUNITY
Start: 2021-08-03 | End: 1900-01-01

## 2024-07-20 ENCOUNTER — NON-APPOINTMENT (OUTPATIENT)
Age: 79
End: 2024-07-20

## 2024-07-29 ENCOUNTER — RX RENEWAL (OUTPATIENT)
Age: 79
End: 2024-07-29

## 2024-08-30 ENCOUNTER — RX RENEWAL (OUTPATIENT)
Age: 79
End: 2024-08-30

## 2024-09-03 ENCOUNTER — NON-APPOINTMENT (OUTPATIENT)
Age: 79
End: 2024-09-03

## 2024-09-24 ENCOUNTER — APPOINTMENT (OUTPATIENT)
Dept: INTERNAL MEDICINE | Facility: CLINIC | Age: 79
End: 2024-09-24
Payer: MEDICARE

## 2024-09-24 ENCOUNTER — NON-APPOINTMENT (OUTPATIENT)
Age: 79
End: 2024-09-24

## 2024-09-24 VITALS
HEIGHT: 65 IN | OXYGEN SATURATION: 95 % | BODY MASS INDEX: 24.66 KG/M2 | SYSTOLIC BLOOD PRESSURE: 146 MMHG | WEIGHT: 148 LBS | HEART RATE: 90 BPM | TEMPERATURE: 98.3 F | DIASTOLIC BLOOD PRESSURE: 81 MMHG

## 2024-09-24 DIAGNOSIS — M81.0 AGE-RELATED OSTEOPOROSIS W/OUT CURRENT PATHOLOGICAL FRACTURE: ICD-10-CM

## 2024-09-24 DIAGNOSIS — Z23 ENCOUNTER FOR IMMUNIZATION: ICD-10-CM

## 2024-09-24 DIAGNOSIS — Z87.2 PERSONAL HISTORY OF DISEASES OF THE SKIN AND SUBCUTANEOUS TISSUE: ICD-10-CM

## 2024-09-24 DIAGNOSIS — H69.91 UNSPECIFIED EUSTACHIAN TUBE DISORDER, RIGHT EAR: ICD-10-CM

## 2024-09-24 DIAGNOSIS — Z01.00 ENCOUNTER FOR EXAMINATION OF EYES AND VISION W/OUT ABNORMAL FINDINGS: ICD-10-CM

## 2024-09-24 DIAGNOSIS — E78.00 PURE HYPERCHOLESTEROLEMIA, UNSPECIFIED: ICD-10-CM

## 2024-09-24 DIAGNOSIS — Z91.89 AGE-RELATED OSTEOPOROSIS W/OUT CURRENT PATHOLOGICAL FRACTURE: ICD-10-CM

## 2024-09-24 DIAGNOSIS — Z00.00 ENCOUNTER FOR GENERAL ADULT MEDICAL EXAMINATION W/OUT ABNORMAL FINDINGS: ICD-10-CM

## 2024-09-24 DIAGNOSIS — Z86.69 PERSONAL HISTORY OF OTHER DISEASES OF THE NERVOUS SYSTEM AND SENSE ORGANS: ICD-10-CM

## 2024-09-24 DIAGNOSIS — H26.9 UNSPECIFIED CATARACT: ICD-10-CM

## 2024-09-24 PROCEDURE — 93000 ELECTROCARDIOGRAM COMPLETE: CPT

## 2024-09-24 PROCEDURE — 91320 SARSCV2 VAC 30MCG TRS-SUC IM: CPT

## 2024-09-24 PROCEDURE — 36415 COLL VENOUS BLD VENIPUNCTURE: CPT

## 2024-09-24 PROCEDURE — G0439: CPT

## 2024-09-24 PROCEDURE — 90480 ADMN SARSCOV2 VAC 1/ONLY CMP: CPT | Mod: GY

## 2024-09-24 NOTE — HISTORY OF PRESENT ILLNESS
[de-identified] : 78 y/o female presents for Annual Wellness visit. Never had DEXA due 7/2024. H/o Osteoporosis. s/p zoledronic acid infusion x 2 (Dr Trevino).  Migraines are main issue-sees Neuro regularly.  New cataract- will need surgery in the next year.

## 2024-09-24 NOTE — HEALTH RISK ASSESSMENT
[Good] : ~his/her~  mood as  good [0] : 2) Feeling down, depressed, or hopeless: Not at all (0) [PHQ-2 Negative - No further assessment needed] : PHQ-2 Negative - No further assessment needed [Never] : Never [Patient reported mammogram was normal] : Patient reported mammogram was normal [Patient reported bone density results were abnormal] : Patient reported bone density results were abnormal [Patient reported colonoscopy was normal] : Patient reported colonoscopy was normal [HIV test declined] : HIV test declined [Hepatitis C test declined] : Hepatitis C test declined [No] : In the past 12 months have you used drugs other than those required for medical reasons? No [No falls in past year] : Patient reported no falls in the past year [[id number]] : 0 [Language] : denies difficulty with language [Behavior] : denies difficulty with behavior [Learning/Retaining New Information] : denies difficulty learning/retaining new information [Handling Complex Tasks] : denies difficulty handling complex tasks [Reasoning] : denies difficulty with reasoning [Spatial Ability and Orientation] : denies difficulty with spatial ability and orientation [None] : None [With Family] : lives with family [] :  [Sexually Active] : sexually active [High Risk Behavior] : no high risk behavior [Feels Safe at Home] : Feels safe at home [Fully functional (bathing, dressing, toileting, transferring, walking, feeding)] : Fully functional (bathing, dressing, toileting, transferring, walking, feeding) [Fully functional (using the telephone, shopping, preparing meals, housekeeping, doing laundry, using] : Fully functional and needs no help or supervision to perform IADLs (using the telephone, shopping, preparing meals, housekeeping, doing laundry, using transportation, managing medications and managing finances) [Reports changes in hearing] : Reports no changes in hearing [Reports changes in vision] : Reports changes in vision [Reports changes in dental health] : Reports no changes in dental health [Smoke Detector] : no smoke detector [Safety elements used in home] : safety elements used in home [Seat Belt] :  uses seat belt [Sunscreen] : uses sunscreen [TB Exposure] : is not being exposed to tuberculosis [Caregiver Concerns] : does not have caregiver concerns [MammogramDate] : 12/23 [PapSmearDate] : 02/24 [BoneDensityDate] : 2022 [ColonoscopyDate] : 07/20 [ColonoscopyComments] : due 7/25

## 2024-09-24 NOTE — ASSESSMENT
[FreeTextEntry1] : 79 year is here for Medicare annual wellness exam. her cognitive function was normal. Please refer to an appropriate section above for a list of providers that are regularly involved in the patient's care. See above for full details of history and physical. The patient's care team was reviewed and documented above. Listed above are the preventative services for which the patient may be due. I informed the patient with a list and offered assistance in scheduling the appropriate exams. Labs sent.  COVID vaccine given. Flu shot at later date. DEXA ordered.

## 2024-09-25 ENCOUNTER — RX RENEWAL (OUTPATIENT)
Age: 79
End: 2024-09-25

## 2024-09-25 LAB
25(OH)D3 SERPL-MCNC: 44.1 NG/ML
ALBUMIN SERPL ELPH-MCNC: 4.4 G/DL
ALP BLD-CCNC: 107 U/L
ALT SERPL-CCNC: 11 U/L
ANION GAP SERPL CALC-SCNC: 13 MMOL/L
AST SERPL-CCNC: 21 U/L
BASOPHILS # BLD AUTO: 0.06 K/UL
BASOPHILS NFR BLD AUTO: 0.7 %
BILIRUB SERPL-MCNC: 0.2 MG/DL
BUN SERPL-MCNC: 17 MG/DL
CALCIUM SERPL-MCNC: 10.1 MG/DL
CHLORIDE SERPL-SCNC: 101 MMOL/L
CHOLEST SERPL-MCNC: 237 MG/DL
CO2 SERPL-SCNC: 25 MMOL/L
CREAT SERPL-MCNC: 0.89 MG/DL
EGFR: 66 ML/MIN/1.73M2
EOSINOPHIL # BLD AUTO: 0.1 K/UL
EOSINOPHIL NFR BLD AUTO: 1.2 %
ESTIMATED AVERAGE GLUCOSE: 117 MG/DL
GLUCOSE SERPL-MCNC: 100 MG/DL
HBA1C MFR BLD HPLC: 5.7 %
HCT VFR BLD CALC: 42.9 %
HDLC SERPL-MCNC: 65 MG/DL
HGB BLD-MCNC: 13.6 G/DL
IMM GRANULOCYTES NFR BLD AUTO: 0.2 %
LDLC SERPL CALC-MCNC: 147 MG/DL
LYMPHOCYTES # BLD AUTO: 2.86 K/UL
LYMPHOCYTES NFR BLD AUTO: 33.6 %
MAN DIFF?: NORMAL
MCHC RBC-ENTMCNC: 31.1 PG
MCHC RBC-ENTMCNC: 31.7 GM/DL
MCV RBC AUTO: 98.2 FL
MONOCYTES # BLD AUTO: 0.9 K/UL
MONOCYTES NFR BLD AUTO: 10.6 %
NEUTROPHILS # BLD AUTO: 4.58 K/UL
NEUTROPHILS NFR BLD AUTO: 53.7 %
NONHDLC SERPL-MCNC: 172 MG/DL
PLATELET # BLD AUTO: 285 K/UL
POTASSIUM SERPL-SCNC: 4.4 MMOL/L
PROT SERPL-MCNC: 6.8 G/DL
RBC # BLD: 4.37 M/UL
RBC # FLD: 12.9 %
SODIUM SERPL-SCNC: 139 MMOL/L
T3 SERPL-MCNC: 130 NG/DL
T4 FREE SERPL-MCNC: 1.1 NG/DL
TRIGL SERPL-MCNC: 141 MG/DL
TSH SERPL-ACNC: 3.87 UIU/ML
VIT B12 SERPL-MCNC: 1052 PG/ML
WBC # FLD AUTO: 8.52 K/UL

## 2024-10-22 ENCOUNTER — APPOINTMENT (OUTPATIENT)
Dept: INTERNAL MEDICINE | Facility: CLINIC | Age: 79
End: 2024-10-22
Payer: MEDICARE

## 2024-10-22 PROCEDURE — G0008: CPT

## 2024-10-22 PROCEDURE — 90662 IIV NO PRSV INCREASED AG IM: CPT

## 2024-10-23 ENCOUNTER — RX RENEWAL (OUTPATIENT)
Age: 79
End: 2024-10-23

## 2024-10-30 ENCOUNTER — APPOINTMENT (OUTPATIENT)
Dept: NEUROLOGY | Facility: CLINIC | Age: 79
End: 2024-10-30
Payer: MEDICARE

## 2024-10-30 VITALS
SYSTOLIC BLOOD PRESSURE: 118 MMHG | DIASTOLIC BLOOD PRESSURE: 78 MMHG | WEIGHT: 145 LBS | HEART RATE: 86 BPM | HEIGHT: 65 IN | BODY MASS INDEX: 24.16 KG/M2

## 2024-10-30 DIAGNOSIS — G43.909 MIGRAINE, UNSPECIFIED, NOT INTRACTABLE, W/OUT STATUS MIGRAINOSUS: ICD-10-CM

## 2024-10-30 DIAGNOSIS — Z65.9 PROBLEM RELATED TO UNSPECIFIED PSYCHOSOCIAL CIRCUMSTANCES: ICD-10-CM

## 2024-10-30 PROCEDURE — 99213 OFFICE O/P EST LOW 20 MIN: CPT

## 2024-11-01 ENCOUNTER — NON-APPOINTMENT (OUTPATIENT)
Age: 79
End: 2024-11-01

## 2024-11-19 ENCOUNTER — NON-APPOINTMENT (OUTPATIENT)
Age: 79
End: 2024-11-19

## 2024-11-26 ENCOUNTER — RX RENEWAL (OUTPATIENT)
Age: 79
End: 2024-11-26

## 2024-12-17 ENCOUNTER — APPOINTMENT (OUTPATIENT)
Dept: MAMMOGRAPHY | Facility: CLINIC | Age: 79
End: 2024-12-17
Payer: MEDICARE

## 2024-12-17 ENCOUNTER — APPOINTMENT (OUTPATIENT)
Dept: ULTRASOUND IMAGING | Facility: CLINIC | Age: 79
End: 2024-12-17
Payer: MEDICARE

## 2024-12-17 ENCOUNTER — OUTPATIENT (OUTPATIENT)
Dept: OUTPATIENT SERVICES | Facility: HOSPITAL | Age: 79
LOS: 1 days | End: 2024-12-17

## 2024-12-17 ENCOUNTER — APPOINTMENT (OUTPATIENT)
Dept: RADIOLOGY | Facility: CLINIC | Age: 79
End: 2024-12-17
Payer: MEDICARE

## 2024-12-17 PROCEDURE — 76641 ULTRASOUND BREAST COMPLETE: CPT | Mod: 26,50

## 2024-12-17 PROCEDURE — 77067 SCR MAMMO BI INCL CAD: CPT | Mod: 26

## 2024-12-17 PROCEDURE — 77085 DXA BONE DENSITY AXL VRT FX: CPT | Mod: 26

## 2024-12-17 PROCEDURE — 77063 BREAST TOMOSYNTHESIS BI: CPT | Mod: 26

## 2024-12-19 ENCOUNTER — APPOINTMENT (OUTPATIENT)
Dept: NEUROLOGY | Facility: CLINIC | Age: 79
End: 2024-12-19
Payer: MEDICARE

## 2024-12-19 ENCOUNTER — NON-APPOINTMENT (OUTPATIENT)
Age: 79
End: 2024-12-19

## 2024-12-19 VITALS
DIASTOLIC BLOOD PRESSURE: 80 MMHG | BODY MASS INDEX: 24.83 KG/M2 | WEIGHT: 149.2 LBS | OXYGEN SATURATION: 94 % | TEMPERATURE: 98.1 F | SYSTOLIC BLOOD PRESSURE: 137 MMHG | HEART RATE: 87 BPM

## 2024-12-19 PROCEDURE — G2211 COMPLEX E/M VISIT ADD ON: CPT

## 2024-12-19 PROCEDURE — 99215 OFFICE O/P EST HI 40 MIN: CPT

## 2024-12-19 RX ORDER — BUTALBITAL, ACETAMINOPHEN AND CAFFEINE 325; 50; 40 MG/1; MG/1; MG/1
50-325-40 TABLET ORAL
Refills: 0 | Status: ACTIVE | COMMUNITY

## 2024-12-19 RX ORDER — ALPRAZOLAM 0.25 MG/1
0.25 TABLET ORAL
Refills: 0 | Status: ACTIVE | COMMUNITY

## 2025-01-14 ENCOUNTER — TRANSCRIPTION ENCOUNTER (OUTPATIENT)
Age: 80
End: 2025-01-14

## 2025-02-05 ENCOUNTER — NON-APPOINTMENT (OUTPATIENT)
Age: 80
End: 2025-02-05

## 2025-02-05 ENCOUNTER — APPOINTMENT (OUTPATIENT)
Dept: OBGYN | Facility: CLINIC | Age: 80
End: 2025-02-05

## 2025-02-05 VITALS
HEIGHT: 65 IN | OXYGEN SATURATION: 94 % | SYSTOLIC BLOOD PRESSURE: 148 MMHG | WEIGHT: 130 LBS | DIASTOLIC BLOOD PRESSURE: 91 MMHG | BODY MASS INDEX: 21.66 KG/M2 | HEART RATE: 92 BPM

## 2025-02-05 DIAGNOSIS — Z01.419 ENCOUNTER FOR GYNECOLOGICAL EXAMINATION (GENERAL) (ROUTINE) W/OUT ABNORMAL FINDINGS: ICD-10-CM

## 2025-02-05 PROCEDURE — G0101: CPT

## 2025-02-10 ENCOUNTER — NON-APPOINTMENT (OUTPATIENT)
Age: 80
End: 2025-02-10

## 2025-02-10 LAB — CYTOLOGY CVX/VAG DOC THIN PREP: NORMAL

## 2025-02-21 ENCOUNTER — RX RENEWAL (OUTPATIENT)
Age: 80
End: 2025-02-21

## 2025-02-25 ENCOUNTER — RX RENEWAL (OUTPATIENT)
Age: 80
End: 2025-02-25

## 2025-03-19 ENCOUNTER — APPOINTMENT (OUTPATIENT)
Dept: INTERNAL MEDICINE | Facility: CLINIC | Age: 80
End: 2025-03-19
Payer: MEDICARE

## 2025-03-19 VITALS
OXYGEN SATURATION: 96 % | DIASTOLIC BLOOD PRESSURE: 82 MMHG | HEART RATE: 81 BPM | WEIGHT: 136 LBS | HEIGHT: 65 IN | SYSTOLIC BLOOD PRESSURE: 143 MMHG | BODY MASS INDEX: 22.66 KG/M2 | TEMPERATURE: 98.4 F

## 2025-03-19 DIAGNOSIS — Z81.8 FAMILY HISTORY OF OTHER MENTAL AND BEHAVIORAL DISORDERS: ICD-10-CM

## 2025-03-19 DIAGNOSIS — E78.00 PURE HYPERCHOLESTEROLEMIA, UNSPECIFIED: ICD-10-CM

## 2025-03-19 DIAGNOSIS — R79.89 OTHER SPECIFIED ABNORMAL FINDINGS OF BLOOD CHEMISTRY: ICD-10-CM

## 2025-03-19 DIAGNOSIS — Z23 ENCOUNTER FOR IMMUNIZATION: ICD-10-CM

## 2025-03-19 DIAGNOSIS — Z78.9 OTHER SPECIFIED HEALTH STATUS: ICD-10-CM

## 2025-03-19 DIAGNOSIS — F43.21 ADJUSTMENT DISORDER WITH DEPRESSED MOOD: ICD-10-CM

## 2025-03-19 DIAGNOSIS — Z65.9 PROBLEM RELATED TO UNSPECIFIED PSYCHOSOCIAL CIRCUMSTANCES: ICD-10-CM

## 2025-03-19 PROCEDURE — G2211 COMPLEX E/M VISIT ADD ON: CPT

## 2025-03-19 PROCEDURE — 36415 COLL VENOUS BLD VENIPUNCTURE: CPT

## 2025-03-19 PROCEDURE — 99214 OFFICE O/P EST MOD 30 MIN: CPT

## 2025-03-19 PROCEDURE — G0537: CPT

## 2025-03-20 PROBLEM — F43.21 GRIEF REACTION: Status: ACTIVE | Noted: 2025-03-20

## 2025-03-20 LAB
ALBUMIN SERPL ELPH-MCNC: 4.4 G/DL
ALP BLD-CCNC: 105 U/L
ALT SERPL-CCNC: 14 U/L
ANION GAP SERPL CALC-SCNC: 13 MMOL/L
AST SERPL-CCNC: 25 U/L
BILIRUB SERPL-MCNC: <0.2 MG/DL
BUN SERPL-MCNC: 16 MG/DL
CALCIUM SERPL-MCNC: 9.8 MG/DL
CHLORIDE SERPL-SCNC: 105 MMOL/L
CHOLEST SERPL-MCNC: 210 MG/DL
CO2 SERPL-SCNC: 22 MMOL/L
CREAT SERPL-MCNC: 0.7 MG/DL
EGFRCR SERPLBLD CKD-EPI 2021: 88 ML/MIN/1.73M2
GLUCOSE SERPL-MCNC: 93 MG/DL
HDLC SERPL-MCNC: 61 MG/DL
LDLC SERPL-MCNC: 117 MG/DL
MEV IGG FLD QL IA: >300 AU/ML
MEV IGG+IGM SER-IMP: POSITIVE
MUV AB SER-ACNC: NEGATIVE
MUV IGG SER QL IA: <5 AU/ML
NONHDLC SERPL-MCNC: 149 MG/DL
POTASSIUM SERPL-SCNC: 4.5 MMOL/L
PROT SERPL-MCNC: 6.8 G/DL
RUBV IGG FLD-ACNC: 4.62 INDEX
RUBV IGG SER-IMP: POSITIVE
SODIUM SERPL-SCNC: 140 MMOL/L
TRIGL SERPL-MCNC: 185 MG/DL
TSH SERPL-ACNC: 3.14 UIU/ML

## 2025-04-02 PROBLEM — F43.20 GRIEF REACTION: Status: ACTIVE | Noted: 2025-03-20

## 2025-04-08 ENCOUNTER — NON-APPOINTMENT (OUTPATIENT)
Age: 80
End: 2025-04-08

## 2025-04-15 RX ORDER — RIMEGEPANT SULFATE 75 MG/75MG
75 TABLET, ORALLY DISINTEGRATING ORAL
Qty: 8 | Refills: 11 | Status: ACTIVE | COMMUNITY
Start: 2025-04-08

## 2025-04-16 ENCOUNTER — RX RENEWAL (OUTPATIENT)
Age: 80
End: 2025-04-16

## 2025-04-24 ENCOUNTER — RX RENEWAL (OUTPATIENT)
Age: 80
End: 2025-04-24

## 2025-04-28 ENCOUNTER — RX RENEWAL (OUTPATIENT)
Age: 80
End: 2025-04-28

## 2025-05-06 ENCOUNTER — APPOINTMENT (OUTPATIENT)
Dept: NEUROLOGY | Facility: CLINIC | Age: 80
End: 2025-05-06
Payer: MEDICARE

## 2025-05-06 VITALS
OXYGEN SATURATION: 96 % | HEART RATE: 86 BPM | SYSTOLIC BLOOD PRESSURE: 134 MMHG | HEIGHT: 65 IN | WEIGHT: 135 LBS | TEMPERATURE: 96.7 F | BODY MASS INDEX: 22.49 KG/M2 | DIASTOLIC BLOOD PRESSURE: 85 MMHG

## 2025-05-06 PROCEDURE — G2211 COMPLEX E/M VISIT ADD ON: CPT

## 2025-05-06 PROCEDURE — 99214 OFFICE O/P EST MOD 30 MIN: CPT

## 2025-06-10 ENCOUNTER — RX RENEWAL (OUTPATIENT)
Age: 80
End: 2025-06-10

## 2025-07-09 ENCOUNTER — APPOINTMENT (OUTPATIENT)
Dept: INTERNAL MEDICINE | Facility: CLINIC | Age: 80
End: 2025-07-09
Payer: MEDICARE

## 2025-07-09 VITALS
BODY MASS INDEX: 22.99 KG/M2 | SYSTOLIC BLOOD PRESSURE: 127 MMHG | OXYGEN SATURATION: 98 % | HEIGHT: 65 IN | HEART RATE: 88 BPM | WEIGHT: 138 LBS | TEMPERATURE: 98.2 F | DIASTOLIC BLOOD PRESSURE: 70 MMHG

## 2025-07-09 PROBLEM — F43.20 GRIEF REACTION: Status: RESOLVED | Noted: 2025-03-20 | Resolved: 2025-07-09

## 2025-07-09 PROBLEM — R09.81 CONGESTION OF NASAL SINUS: Status: ACTIVE | Noted: 2025-07-09

## 2025-07-09 PROBLEM — H92.01 RIGHT EAR PAIN: Status: ACTIVE | Noted: 2025-07-09

## 2025-07-09 PROCEDURE — 99213 OFFICE O/P EST LOW 20 MIN: CPT

## 2025-07-09 PROCEDURE — G2211 COMPLEX E/M VISIT ADD ON: CPT

## 2025-07-09 RX ORDER — AZELASTINE HYDROCHLORIDE 137 UG/1
0.1 SPRAY, METERED NASAL
Qty: 1 | Refills: 1 | Status: ACTIVE | COMMUNITY
Start: 2025-07-09 | End: 1900-01-01

## 2025-08-14 ENCOUNTER — EMERGENCY (EMERGENCY)
Facility: HOSPITAL | Age: 80
LOS: 1 days | End: 2025-08-14
Attending: STUDENT IN AN ORGANIZED HEALTH CARE EDUCATION/TRAINING PROGRAM | Admitting: STUDENT IN AN ORGANIZED HEALTH CARE EDUCATION/TRAINING PROGRAM
Payer: MEDICARE

## 2025-08-14 VITALS
TEMPERATURE: 98 F | WEIGHT: 145.06 LBS | HEIGHT: 65 IN | DIASTOLIC BLOOD PRESSURE: 82 MMHG | OXYGEN SATURATION: 98 % | SYSTOLIC BLOOD PRESSURE: 142 MMHG | HEART RATE: 80 BPM | RESPIRATION RATE: 18 BRPM

## 2025-08-14 VITALS
TEMPERATURE: 99 F | SYSTOLIC BLOOD PRESSURE: 162 MMHG | DIASTOLIC BLOOD PRESSURE: 82 MMHG | HEART RATE: 78 BPM | RESPIRATION RATE: 18 BRPM | OXYGEN SATURATION: 96 %

## 2025-08-14 DIAGNOSIS — R20.2 PARESTHESIA OF SKIN: ICD-10-CM

## 2025-08-14 DIAGNOSIS — R11.2 NAUSEA WITH VOMITING, UNSPECIFIED: ICD-10-CM

## 2025-08-14 DIAGNOSIS — Z86.69 PERSONAL HISTORY OF OTHER DISEASES OF THE NERVOUS SYSTEM AND SENSE ORGANS: ICD-10-CM

## 2025-08-14 LAB
ALBUMIN SERPL ELPH-MCNC: 4.2 G/DL — SIGNIFICANT CHANGE UP (ref 3.3–5)
ALP SERPL-CCNC: 99 U/L — SIGNIFICANT CHANGE UP (ref 40–120)
ALT FLD-CCNC: 8 U/L — LOW (ref 10–45)
ANION GAP SERPL CALC-SCNC: 9 MMOL/L — SIGNIFICANT CHANGE UP (ref 5–17)
APTT BLD: 29.3 SEC — SIGNIFICANT CHANGE UP (ref 26.1–36.8)
AST SERPL-CCNC: 20 U/L — SIGNIFICANT CHANGE UP (ref 10–40)
BASOPHILS # BLD AUTO: 0.05 K/UL — SIGNIFICANT CHANGE UP (ref 0–0.2)
BASOPHILS NFR BLD AUTO: 0.4 % — SIGNIFICANT CHANGE UP (ref 0–2)
BILIRUB SERPL-MCNC: 0.2 MG/DL — SIGNIFICANT CHANGE UP (ref 0.2–1.2)
BUN SERPL-MCNC: 18 MG/DL — SIGNIFICANT CHANGE UP (ref 7–23)
CALCIUM SERPL-MCNC: 9.5 MG/DL — SIGNIFICANT CHANGE UP (ref 8.4–10.5)
CHLORIDE SERPL-SCNC: 106 MMOL/L — SIGNIFICANT CHANGE UP (ref 96–108)
CO2 SERPL-SCNC: 24 MMOL/L — SIGNIFICANT CHANGE UP (ref 22–31)
CREAT SERPL-MCNC: 0.8 MG/DL — SIGNIFICANT CHANGE UP (ref 0.5–1.3)
EGFR: 75 ML/MIN/1.73M2 — SIGNIFICANT CHANGE UP
EGFR: 75 ML/MIN/1.73M2 — SIGNIFICANT CHANGE UP
EOSINOPHIL # BLD AUTO: 0.06 K/UL — SIGNIFICANT CHANGE UP (ref 0–0.5)
EOSINOPHIL NFR BLD AUTO: 0.5 % — SIGNIFICANT CHANGE UP (ref 0–6)
GLUCOSE SERPL-MCNC: 135 MG/DL — HIGH (ref 70–99)
HCT VFR BLD CALC: 39.5 % — SIGNIFICANT CHANGE UP (ref 34.5–45)
HGB BLD-MCNC: 13.1 G/DL — SIGNIFICANT CHANGE UP (ref 11.5–15.5)
IMM GRANULOCYTES # BLD AUTO: 0.04 K/UL — SIGNIFICANT CHANGE UP (ref 0–0.07)
IMM GRANULOCYTES NFR BLD AUTO: 0.3 % — SIGNIFICANT CHANGE UP (ref 0–0.9)
INR BLD: 0.98 — SIGNIFICANT CHANGE UP (ref 0.85–1.16)
LYMPHOCYTES # BLD AUTO: 1.89 K/UL — SIGNIFICANT CHANGE UP (ref 1–3.3)
LYMPHOCYTES NFR BLD AUTO: 16.2 % — SIGNIFICANT CHANGE UP (ref 13–44)
MCHC RBC-ENTMCNC: 31.1 PG — SIGNIFICANT CHANGE UP (ref 27–34)
MCHC RBC-ENTMCNC: 33.2 G/DL — SIGNIFICANT CHANGE UP (ref 32–36)
MCV RBC AUTO: 93.8 FL — SIGNIFICANT CHANGE UP (ref 80–100)
MONOCYTES # BLD AUTO: 0.63 K/UL — SIGNIFICANT CHANGE UP (ref 0–0.9)
MONOCYTES NFR BLD AUTO: 5.4 % — SIGNIFICANT CHANGE UP (ref 2–14)
NEUTROPHILS # BLD AUTO: 9 K/UL — HIGH (ref 1.8–7.4)
NEUTROPHILS NFR BLD AUTO: 77.2 % — HIGH (ref 43–77)
NRBC # BLD AUTO: 0 K/UL — SIGNIFICANT CHANGE UP (ref 0–0)
NRBC # FLD: 0 K/UL — SIGNIFICANT CHANGE UP (ref 0–0)
NRBC BLD AUTO-RTO: 0 /100 WBCS — SIGNIFICANT CHANGE UP (ref 0–0)
PLATELET # BLD AUTO: 266 K/UL — SIGNIFICANT CHANGE UP (ref 150–400)
PMV BLD: 10 FL — SIGNIFICANT CHANGE UP (ref 7–13)
POTASSIUM SERPL-MCNC: 4 MMOL/L — SIGNIFICANT CHANGE UP (ref 3.5–5.3)
POTASSIUM SERPL-SCNC: 4 MMOL/L — SIGNIFICANT CHANGE UP (ref 3.5–5.3)
PROT SERPL-MCNC: 7.1 G/DL — SIGNIFICANT CHANGE UP (ref 6–8.3)
PROTHROM AB SERPL-ACNC: 11.3 SEC — SIGNIFICANT CHANGE UP (ref 9.9–13.4)
RBC # BLD: 4.21 M/UL — SIGNIFICANT CHANGE UP (ref 3.8–5.2)
RBC # FLD: 13 % — SIGNIFICANT CHANGE UP (ref 10.3–14.5)
SODIUM SERPL-SCNC: 139 MMOL/L — SIGNIFICANT CHANGE UP (ref 135–145)
TROPONIN T, HIGH SENSITIVITY RESULT: <6 NG/L — SIGNIFICANT CHANGE UP (ref 0–51)
WBC # BLD: 11.67 K/UL — HIGH (ref 3.8–10.5)
WBC # FLD AUTO: 11.67 K/UL — HIGH (ref 3.8–10.5)

## 2025-08-14 PROCEDURE — 70498 CT ANGIOGRAPHY NECK: CPT | Mod: 26

## 2025-08-14 PROCEDURE — 99285 EMERGENCY DEPT VISIT HI MDM: CPT

## 2025-08-14 PROCEDURE — 36415 COLL VENOUS BLD VENIPUNCTURE: CPT

## 2025-08-14 PROCEDURE — 96374 THER/PROPH/DIAG INJ IV PUSH: CPT | Mod: XU

## 2025-08-14 PROCEDURE — 85730 THROMBOPLASTIN TIME PARTIAL: CPT

## 2025-08-14 PROCEDURE — 99285 EMERGENCY DEPT VISIT HI MDM: CPT | Mod: 25

## 2025-08-14 PROCEDURE — 70496 CT ANGIOGRAPHY HEAD: CPT | Mod: 26

## 2025-08-14 PROCEDURE — 85610 PROTHROMBIN TIME: CPT

## 2025-08-14 PROCEDURE — 0042T: CPT

## 2025-08-14 PROCEDURE — 85025 COMPLETE CBC W/AUTO DIFF WBC: CPT

## 2025-08-14 PROCEDURE — 70450 CT HEAD/BRAIN W/O DYE: CPT

## 2025-08-14 PROCEDURE — 80053 COMPREHEN METABOLIC PANEL: CPT

## 2025-08-14 PROCEDURE — 84484 ASSAY OF TROPONIN QUANT: CPT

## 2025-08-14 PROCEDURE — 70450 CT HEAD/BRAIN W/O DYE: CPT | Mod: 26,XU

## 2025-08-14 PROCEDURE — 82962 GLUCOSE BLOOD TEST: CPT

## 2025-08-14 PROCEDURE — 70496 CT ANGIOGRAPHY HEAD: CPT

## 2025-08-14 PROCEDURE — 70498 CT ANGIOGRAPHY NECK: CPT

## 2025-08-14 RX ORDER — METOCLOPRAMIDE HCL 10 MG
10 TABLET ORAL ONCE
Refills: 0 | Status: COMPLETED | OUTPATIENT
Start: 2025-08-14 | End: 2025-08-14

## 2025-08-14 RX ORDER — MECLIZINE HCL 12.5 MG
1 TABLET ORAL
Qty: 10 | Refills: 0
Start: 2025-08-14 | End: 2025-08-18

## 2025-08-14 RX ORDER — MECLIZINE HCL 12.5 MG
25 TABLET ORAL ONCE
Refills: 0 | Status: COMPLETED | OUTPATIENT
Start: 2025-08-14 | End: 2025-08-14

## 2025-08-14 RX ADMIN — Medication 10 MILLIGRAM(S): at 11:46

## 2025-08-14 RX ADMIN — Medication 125 MILLILITER(S): at 11:41

## 2025-08-14 RX ADMIN — Medication 25 MILLIGRAM(S): at 11:43
